# Patient Record
Sex: FEMALE | Race: BLACK OR AFRICAN AMERICAN | NOT HISPANIC OR LATINO | URBAN - METROPOLITAN AREA
[De-identification: names, ages, dates, MRNs, and addresses within clinical notes are randomized per-mention and may not be internally consistent; named-entity substitution may affect disease eponyms.]

---

## 2018-11-15 ENCOUNTER — OFFICE VISIT - HEALTHEAST (OUTPATIENT)
Dept: FAMILY MEDICINE | Facility: CLINIC | Age: 18
End: 2018-11-15

## 2018-11-15 ENCOUNTER — COMMUNICATION - HEALTHEAST (OUTPATIENT)
Dept: TELEHEALTH | Facility: CLINIC | Age: 18
End: 2018-11-15

## 2018-11-15 DIAGNOSIS — R76.8 ELEVATED ANTINUCLEAR ANTIBODY (ANA) LEVEL: ICD-10-CM

## 2018-11-15 DIAGNOSIS — L65.9 HAIR LOSS: ICD-10-CM

## 2018-11-15 LAB — TSH SERPL DL<=0.005 MIU/L-ACNC: 1.33 UIU/ML (ref 0.3–5)

## 2018-11-15 ASSESSMENT — MIFFLIN-ST. JEOR: SCORE: 1212.21

## 2018-11-16 ENCOUNTER — COMMUNICATION - HEALTHEAST (OUTPATIENT)
Dept: FAMILY MEDICINE | Facility: CLINIC | Age: 18
End: 2018-11-16

## 2019-03-06 ENCOUNTER — OFFICE VISIT - HEALTHEAST (OUTPATIENT)
Dept: FAMILY MEDICINE | Facility: CLINIC | Age: 19
End: 2019-03-06

## 2019-03-06 DIAGNOSIS — B37.31 YEAST INFECTION OF THE VAGINA: ICD-10-CM

## 2019-03-06 ASSESSMENT — MIFFLIN-ST. JEOR: SCORE: 1198.03

## 2019-04-08 ENCOUNTER — AMBULATORY - HEALTHEAST (OUTPATIENT)
Dept: FAMILY MEDICINE | Facility: CLINIC | Age: 19
End: 2019-04-08

## 2019-04-08 ENCOUNTER — OFFICE VISIT - HEALTHEAST (OUTPATIENT)
Dept: FAMILY MEDICINE | Facility: CLINIC | Age: 19
End: 2019-04-08

## 2019-04-08 DIAGNOSIS — N76.0 ACUTE VAGINITIS: ICD-10-CM

## 2019-04-08 DIAGNOSIS — B37.31 YEAST INFECTION OF THE VAGINA: ICD-10-CM

## 2019-04-08 LAB
CLUE CELLS: ABNORMAL
TRICHOMONAS, WET PREP: ABNORMAL
YEAST, WET PREP: ABNORMAL

## 2019-04-08 ASSESSMENT — MIFFLIN-ST. JEOR: SCORE: 1188.96

## 2019-04-11 ENCOUNTER — COMMUNICATION - HEALTHEAST (OUTPATIENT)
Dept: FAMILY MEDICINE | Facility: CLINIC | Age: 19
End: 2019-04-11

## 2019-04-23 ENCOUNTER — OFFICE VISIT - HEALTHEAST (OUTPATIENT)
Dept: FAMILY MEDICINE | Facility: CLINIC | Age: 19
End: 2019-04-23

## 2019-04-23 DIAGNOSIS — R07.81 RIB PAIN ON RIGHT SIDE: ICD-10-CM

## 2019-04-23 DIAGNOSIS — S22.31XA CLOSED FRACTURE OF ONE RIB OF RIGHT SIDE, INITIAL ENCOUNTER: ICD-10-CM

## 2019-05-08 ENCOUNTER — COMMUNICATION - HEALTHEAST (OUTPATIENT)
Dept: SCHEDULING | Facility: CLINIC | Age: 19
End: 2019-05-08

## 2019-05-08 ENCOUNTER — OFFICE VISIT - HEALTHEAST (OUTPATIENT)
Dept: FAMILY MEDICINE | Facility: CLINIC | Age: 19
End: 2019-05-08

## 2019-05-08 DIAGNOSIS — J01.90 ACUTE NON-RECURRENT SINUSITIS, UNSPECIFIED LOCATION: ICD-10-CM

## 2019-05-22 ENCOUNTER — OFFICE VISIT (OUTPATIENT)
Dept: URGENT CARE | Facility: URGENT CARE | Age: 19
End: 2019-05-22
Payer: COMMERCIAL

## 2019-05-22 VITALS
HEART RATE: 94 BPM | OXYGEN SATURATION: 100 % | DIASTOLIC BLOOD PRESSURE: 82 MMHG | WEIGHT: 101.4 LBS | TEMPERATURE: 98.7 F | SYSTOLIC BLOOD PRESSURE: 118 MMHG

## 2019-05-22 DIAGNOSIS — R42 DIZZINESS: Primary | ICD-10-CM

## 2019-05-22 LAB
BASOPHILS # BLD AUTO: 0 10E9/L (ref 0–0.2)
BASOPHILS NFR BLD AUTO: 0.2 %
DIFFERENTIAL METHOD BLD: NORMAL
EOSINOPHIL # BLD AUTO: 0.1 10E9/L (ref 0–0.7)
EOSINOPHIL NFR BLD AUTO: 0.9 %
ERYTHROCYTE [DISTWIDTH] IN BLOOD BY AUTOMATED COUNT: 11.8 % (ref 10–15)
HCT VFR BLD AUTO: 40 % (ref 35–47)
HGB BLD-MCNC: 13.1 G/DL (ref 11.7–15.7)
LYMPHOCYTES # BLD AUTO: 3 10E9/L (ref 0.8–5.3)
LYMPHOCYTES NFR BLD AUTO: 36.2 %
MCH RBC QN AUTO: 30.1 PG (ref 26.5–33)
MCHC RBC AUTO-ENTMCNC: 32.8 G/DL (ref 31.5–36.5)
MCV RBC AUTO: 92 FL (ref 78–100)
MONOCYTES # BLD AUTO: 0.6 10E9/L (ref 0–1.3)
MONOCYTES NFR BLD AUTO: 7.5 %
NEUTROPHILS # BLD AUTO: 4.5 10E9/L (ref 1.6–8.3)
NEUTROPHILS NFR BLD AUTO: 55.2 %
PLATELET # BLD AUTO: 291 10E9/L (ref 150–450)
RBC # BLD AUTO: 4.35 10E12/L (ref 3.8–5.2)
WBC # BLD AUTO: 8.2 10E9/L (ref 4–11)

## 2019-05-22 PROCEDURE — 36415 COLL VENOUS BLD VENIPUNCTURE: CPT | Performed by: PHYSICIAN ASSISTANT

## 2019-05-22 PROCEDURE — 85025 COMPLETE CBC W/AUTO DIFF WBC: CPT | Performed by: PHYSICIAN ASSISTANT

## 2019-05-22 PROCEDURE — 99203 OFFICE O/P NEW LOW 30 MIN: CPT | Performed by: PHYSICIAN ASSISTANT

## 2019-05-22 RX ORDER — MECLIZINE HYDROCHLORIDE 25 MG/1
25 TABLET ORAL 3 TIMES DAILY PRN
Qty: 30 TABLET | Refills: 0 | Status: SHIPPED | OUTPATIENT
Start: 2019-05-22

## 2019-05-23 NOTE — PATIENT INSTRUCTIONS
Patient Education     Labyrinthitis    The inner ear is located behind the middle ear. It is part of the balance center of your body. When the inner ear becomes irritated or inflamed it causes a condition known as labyrinthitis. It may due to a viral infection, but often a cause is not found. Labyrinthitis causes sudden dizziness and balance problems. It often causes a feeling that you or the room is spinning (vertigo). You may feel nauseated or throw up. You may also feel a loss of balance when trying to walk. Head movement from side to side or changes in body position (from lying to sitting or standing) may worsen symptoms. You may have ringing in the ear. Hearing may also be affected.  An episode of labyrinthitis may last seconds, minutes, or hours. It may never return. Or symptoms may recur off and on over several weeks or longer. In many cases, the problem is short-term and goes away when the inner ear issue resolves.  Home care    Take medicine as prescribed to relieve your symptoms. Unless another medicine was prescribed, you can try over-the-counter motion sickness pills. Note that these medicines may cause drowsiness.    If symptoms are severe, rest quietly in bed. Change positions slowly. There may be 1 position feels best, such as lying on 1 side or lying on your back with your head slightly raised on pillows. Until you have no symptoms, you are at a higher risk of falling. Let someone help you when you get up. Get rid of home hazards such as loose electrical cords and throw rugs. Don t walk in unfamiliar areas that are not lighted. Use night lights in bathrooms and kitchen areas.    Vestibular rehabilitation exercises are done by moving your head to help fix problems in the inner ear. If these exercises have been prescribed, do them as you have been instructed.    Do not drive or work with dangerous machinery until 1 week has passed without symptoms. Be careful when using stairs.  Follow-up  care  Follow up with your healthcare provider or as advised by our staff.  When to seek medical advice  Call your healthcare provider for any of the following:    Symptoms that are not controlled by medicine     Symptoms that get worse    Repeated vomiting that is not relieved by medicine    Weakness that gets worse    Fainting    Headache or unusual drowsiness    Trouble with vision or speech    Trouble moving your face, arms, or legs    Hearing loss    Symptoms that last more than a few days  Date Last Reviewed: 11/1/2017 2000-2018 [employer] [address] [place] [address]. All rights reserved. This information is not intended as a substitute for professional medical care. Always follow your healthcare professional's instructions.

## 2019-06-01 NOTE — PROGRESS NOTES
SUBJECTIVE:   Rosette Montero is a 18 year old female who complains of new onset positional vertigo for 21 days. Has not had this in the past. The patient denies any other symptoms of neurological impairment or TIA's; no amaurosis, diplopia, dysphasia, or unilateral disturbance of motor or sensory function. No headaches. No hearing loss or tinnitus, nor head injury. No palpitations or syncope. Healthy in the past.    OBJECTIVE:  Appears well, in no apparent distress. Vitals normal. Ears normal. Neck supple. No adenopathy or masses in the neck or supraclavicular regions. Cranial nerves are normal. Fundi are normal with sharp disc margins, no papilledema, hemorrhages or exudates noted. MARLI. EOM's intact. DTR's normal and symmetric. Mental status normal. Gait and station normal. Romberg negative. Cerebellar function is normal. No internuclear ophthalmoplegia. Pulse regular. Rapid changes in position during the exam do precipitate brief dizziness without nystagmus.    Results for orders placed or performed in visit on 05/22/19   CBC with platelets and differential   Result Value Ref Range    WBC 8.2 4.0 - 11.0 10e9/L    RBC Count 4.35 3.8 - 5.2 10e12/L    Hemoglobin 13.1 11.7 - 15.7 g/dL    Hematocrit 40.0 35.0 - 47.0 %    MCV 92 78 - 100 fl    MCH 30.1 26.5 - 33.0 pg    MCHC 32.8 31.5 - 36.5 g/dL    RDW 11.8 10.0 - 15.0 %    Platelet Count 291 150 - 450 10e9/L    Diff Method Automated Method     % Neutrophils 55.2 %    % Lymphocytes 36.2 %    % Monocytes 7.5 %    % Eosinophils 0.9 %    % Basophils 0.2 %    Absolute Neutrophil 4.5 1.6 - 8.3 10e9/L    Absolute Lymphocytes 3.0 0.8 - 5.3 10e9/L    Absolute Monocytes 0.6 0.0 - 1.3 10e9/L    Absolute Eosinophils 0.1 0.0 - 0.7 10e9/L    Absolute Basophils 0.0 0.0 - 0.2 10e9/L         ASSESSMENT:  (R42) Dizziness  (primary encounter diagnosis)  Comment: no evidence of central etiology  Plan: CBC with platelets and differential, meclizine         (ANTIVERT) 25 MG  tablet    PLAN:  The patient is reassured that these symptoms do not appear to represent a serious or threatening condition. This is generally a self-limited temporary but uncomfortable situation. Rest, avoid potentially dangerous activities (such as driving or working with machinery or at heights), use OTC Meclizine prn. Asked to call if develops other symptoms, such as alterations of speech, swallowing, vision, motor or sensory systems, or if dizziness persists or worsens.

## 2019-06-20 ENCOUNTER — OFFICE VISIT - HEALTHEAST (OUTPATIENT)
Dept: FAMILY MEDICINE | Facility: CLINIC | Age: 19
End: 2019-06-20

## 2019-06-20 DIAGNOSIS — R22.0 FACIAL SWELLING: ICD-10-CM

## 2019-08-27 ENCOUNTER — OFFICE VISIT - HEALTHEAST (OUTPATIENT)
Dept: FAMILY MEDICINE | Facility: CLINIC | Age: 19
End: 2019-08-27

## 2019-08-27 DIAGNOSIS — Z00.00 ANNUAL PHYSICAL EXAM: ICD-10-CM

## 2019-08-27 DIAGNOSIS — F41.9 ANXIETY: ICD-10-CM

## 2019-08-27 DIAGNOSIS — L70.0 ACNE VULGARIS: ICD-10-CM

## 2019-08-27 DIAGNOSIS — F33.1 MODERATE EPISODE OF RECURRENT MAJOR DEPRESSIVE DISORDER (H): ICD-10-CM

## 2019-08-27 DIAGNOSIS — Z23 NEED FOR VACCINATION: ICD-10-CM

## 2019-08-27 LAB — HGB BLD-MCNC: 14.6 G/DL (ref 12–16)

## 2019-08-27 ASSESSMENT — MIFFLIN-ST. JEOR: SCORE: 1169.12

## 2019-08-28 LAB
ANION GAP SERPL CALCULATED.3IONS-SCNC: 14 MMOL/L (ref 5–18)
BKR LAB AP ABNORMAL BLEEDING: NO
BKR LAB AP BIRTH CONTROL/HORMONES: NORMAL
BKR LAB AP CERVICAL APPEARANCE: NORMAL
BKR LAB AP GYN ADEQUACY: NORMAL
BKR LAB AP GYN INTERPRETATION: NORMAL
BKR LAB AP HPV REFLEX: NORMAL
BKR LAB AP LMP: NORMAL
BKR LAB AP PATIENT STATUS: NORMAL
BKR LAB AP PREVIOUS ABNORMAL: NO
BKR LAB AP PREVIOUS NORMAL: NORMAL
BUN SERPL-MCNC: 17 MG/DL (ref 8–22)
CALCIUM SERPL-MCNC: 10.3 MG/DL (ref 8.5–10.5)
CHLORIDE BLD-SCNC: 103 MMOL/L (ref 98–107)
CHOLEST SERPL-MCNC: 186 MG/DL
CO2 SERPL-SCNC: 24 MMOL/L (ref 22–31)
CREAT SERPL-MCNC: 1 MG/DL (ref 0.6–1.1)
FASTING STATUS PATIENT QL REPORTED: NO
GFR SERPL CREATININE-BSD FRML MDRD: >60 ML/MIN/1.73M2
GLUCOSE BLD-MCNC: 92 MG/DL (ref 70–125)
HDLC SERPL-MCNC: 74 MG/DL
HIGH RISK?: NO
HIV 1+2 AB+HIV1 P24 AG SERPL QL IA: NEGATIVE
LDLC SERPL CALC-MCNC: 102 MG/DL
PATH REPORT.COMMENTS IMP SPEC: NORMAL
POTASSIUM BLD-SCNC: 3.9 MMOL/L (ref 3.5–5)
RESULT FLAG (HE HISTORICAL CONVERSION): NORMAL
SODIUM SERPL-SCNC: 141 MMOL/L (ref 136–145)
TRIGL SERPL-MCNC: 51 MG/DL

## 2019-08-29 LAB
25(OH)D3 SERPL-MCNC: 35.9 NG/ML (ref 30–80)
25(OH)D3 SERPL-MCNC: 35.9 NG/ML (ref 30–80)

## 2019-09-21 ENCOUNTER — COMMUNICATION - HEALTHEAST (OUTPATIENT)
Dept: FAMILY MEDICINE | Facility: CLINIC | Age: 19
End: 2019-09-21

## 2019-09-21 DIAGNOSIS — F41.9 ANXIETY: ICD-10-CM

## 2019-09-21 DIAGNOSIS — F33.1 MODERATE EPISODE OF RECURRENT MAJOR DEPRESSIVE DISORDER (H): ICD-10-CM

## 2019-09-24 ENCOUNTER — OFFICE VISIT - HEALTHEAST (OUTPATIENT)
Dept: FAMILY MEDICINE | Facility: CLINIC | Age: 19
End: 2019-09-24

## 2019-09-24 DIAGNOSIS — F33.1 MODERATE EPISODE OF RECURRENT MAJOR DEPRESSIVE DISORDER (H): ICD-10-CM

## 2019-09-24 DIAGNOSIS — L70.0 ACNE VULGARIS: ICD-10-CM

## 2019-09-24 DIAGNOSIS — F41.9 ANXIETY: ICD-10-CM

## 2019-09-24 ASSESSMENT — ANXIETY QUESTIONNAIRES
6. BECOMING EASILY ANNOYED OR IRRITABLE: NOT AT ALL
1. FEELING NERVOUS, ANXIOUS, OR ON EDGE: MORE THAN HALF THE DAYS
GAD7 TOTAL SCORE: 6
5. BEING SO RESTLESS THAT IT IS HARD TO SIT STILL: SEVERAL DAYS
3. WORRYING TOO MUCH ABOUT DIFFERENT THINGS: SEVERAL DAYS
7. FEELING AFRAID AS IF SOMETHING AWFUL MIGHT HAPPEN: SEVERAL DAYS
4. TROUBLE RELAXING: NOT AT ALL
2. NOT BEING ABLE TO STOP OR CONTROL WORRYING: SEVERAL DAYS

## 2019-09-24 ASSESSMENT — PATIENT HEALTH QUESTIONNAIRE - PHQ9: SUM OF ALL RESPONSES TO PHQ QUESTIONS 1-9: 15

## 2019-09-24 ASSESSMENT — MIFFLIN-ST. JEOR: SCORE: 1171.38

## 2019-10-23 ENCOUNTER — COMMUNICATION - HEALTHEAST (OUTPATIENT)
Dept: FAMILY MEDICINE | Facility: CLINIC | Age: 19
End: 2019-10-23

## 2019-10-23 DIAGNOSIS — L70.0 ACNE VULGARIS: ICD-10-CM

## 2019-11-13 ENCOUNTER — COMMUNICATION - HEALTHEAST (OUTPATIENT)
Dept: FAMILY MEDICINE | Facility: CLINIC | Age: 19
End: 2019-11-13

## 2019-11-14 ENCOUNTER — AMBULATORY - HEALTHEAST (OUTPATIENT)
Dept: FAMILY MEDICINE | Facility: CLINIC | Age: 19
End: 2019-11-14

## 2019-11-14 DIAGNOSIS — B37.31 VAGINAL YEAST INFECTION: ICD-10-CM

## 2019-11-18 ENCOUNTER — COMMUNICATION - HEALTHEAST (OUTPATIENT)
Dept: FAMILY MEDICINE | Facility: CLINIC | Age: 19
End: 2019-11-18

## 2019-11-23 ENCOUNTER — COMMUNICATION - HEALTHEAST (OUTPATIENT)
Dept: FAMILY MEDICINE | Facility: CLINIC | Age: 19
End: 2019-11-23

## 2020-01-21 ENCOUNTER — COMMUNICATION - HEALTHEAST (OUTPATIENT)
Dept: FAMILY MEDICINE | Facility: CLINIC | Age: 20
End: 2020-01-21

## 2020-01-21 DIAGNOSIS — L70.0 ACNE VULGARIS: ICD-10-CM

## 2020-01-27 ENCOUNTER — COMMUNICATION - HEALTHEAST (OUTPATIENT)
Dept: FAMILY MEDICINE | Facility: CLINIC | Age: 20
End: 2020-01-27

## 2020-01-30 ENCOUNTER — OFFICE VISIT - HEALTHEAST (OUTPATIENT)
Dept: FAMILY MEDICINE | Facility: CLINIC | Age: 20
End: 2020-01-30

## 2020-01-30 DIAGNOSIS — F33.2 SEVERE EPISODE OF RECURRENT MAJOR DEPRESSIVE DISORDER, WITHOUT PSYCHOTIC FEATURES (H): ICD-10-CM

## 2020-01-30 DIAGNOSIS — F41.9 ANXIETY: ICD-10-CM

## 2020-01-30 DIAGNOSIS — Z30.09 BIRTH CONTROL COUNSELING: ICD-10-CM

## 2020-01-30 ASSESSMENT — ANXIETY QUESTIONNAIRES
2. NOT BEING ABLE TO STOP OR CONTROL WORRYING: NOT AT ALL
3. WORRYING TOO MUCH ABOUT DIFFERENT THINGS: SEVERAL DAYS
5. BEING SO RESTLESS THAT IT IS HARD TO SIT STILL: MORE THAN HALF THE DAYS
IF YOU CHECKED OFF ANY PROBLEMS ON THIS QUESTIONNAIRE, HOW DIFFICULT HAVE THESE PROBLEMS MADE IT FOR YOU TO DO YOUR WORK, TAKE CARE OF THINGS AT HOME, OR GET ALONG WITH OTHER PEOPLE: NOT DIFFICULT AT ALL
GAD7 TOTAL SCORE: 7
6. BECOMING EASILY ANNOYED OR IRRITABLE: SEVERAL DAYS
7. FEELING AFRAID AS IF SOMETHING AWFUL MIGHT HAPPEN: SEVERAL DAYS
4. TROUBLE RELAXING: SEVERAL DAYS
1. FEELING NERVOUS, ANXIOUS, OR ON EDGE: SEVERAL DAYS

## 2020-01-30 ASSESSMENT — PATIENT HEALTH QUESTIONNAIRE - PHQ9: SUM OF ALL RESPONSES TO PHQ QUESTIONS 1-9: 13

## 2020-03-22 ENCOUNTER — COMMUNICATION - HEALTHEAST (OUTPATIENT)
Dept: FAMILY MEDICINE | Facility: CLINIC | Age: 20
End: 2020-03-22

## 2020-03-22 DIAGNOSIS — F41.9 ANXIETY: ICD-10-CM

## 2020-03-22 DIAGNOSIS — F33.2 SEVERE EPISODE OF RECURRENT MAJOR DEPRESSIVE DISORDER, WITHOUT PSYCHOTIC FEATURES (H): ICD-10-CM

## 2020-04-23 ENCOUNTER — COMMUNICATION - HEALTHEAST (OUTPATIENT)
Dept: FAMILY MEDICINE | Facility: CLINIC | Age: 20
End: 2020-04-23

## 2020-04-23 DIAGNOSIS — F41.9 ANXIETY: ICD-10-CM

## 2020-04-23 DIAGNOSIS — F33.2 SEVERE EPISODE OF RECURRENT MAJOR DEPRESSIVE DISORDER, WITHOUT PSYCHOTIC FEATURES (H): ICD-10-CM

## 2020-04-27 ENCOUNTER — OFFICE VISIT - HEALTHEAST (OUTPATIENT)
Dept: FAMILY MEDICINE | Facility: CLINIC | Age: 20
End: 2020-04-27

## 2020-04-27 DIAGNOSIS — L70.0 ACNE VULGARIS: ICD-10-CM

## 2020-04-27 DIAGNOSIS — Z30.09 BIRTH CONTROL COUNSELING: ICD-10-CM

## 2020-04-27 DIAGNOSIS — F41.9 ANXIETY: ICD-10-CM

## 2020-04-27 DIAGNOSIS — F33.1 MODERATE EPISODE OF RECURRENT MAJOR DEPRESSIVE DISORDER (H): ICD-10-CM

## 2020-04-27 ASSESSMENT — ANXIETY QUESTIONNAIRES
3. WORRYING TOO MUCH ABOUT DIFFERENT THINGS: SEVERAL DAYS
1. FEELING NERVOUS, ANXIOUS, OR ON EDGE: MORE THAN HALF THE DAYS
7. FEELING AFRAID AS IF SOMETHING AWFUL MIGHT HAPPEN: NOT AT ALL
4. TROUBLE RELAXING: SEVERAL DAYS
GAD7 TOTAL SCORE: 9
5. BEING SO RESTLESS THAT IT IS HARD TO SIT STILL: NEARLY EVERY DAY
2. NOT BEING ABLE TO STOP OR CONTROL WORRYING: NOT AT ALL
IF YOU CHECKED OFF ANY PROBLEMS ON THIS QUESTIONNAIRE, HOW DIFFICULT HAVE THESE PROBLEMS MADE IT FOR YOU TO DO YOUR WORK, TAKE CARE OF THINGS AT HOME, OR GET ALONG WITH OTHER PEOPLE: NOT DIFFICULT AT ALL
6. BECOMING EASILY ANNOYED OR IRRITABLE: MORE THAN HALF THE DAYS

## 2020-04-27 ASSESSMENT — PATIENT HEALTH QUESTIONNAIRE - PHQ9: SUM OF ALL RESPONSES TO PHQ QUESTIONS 1-9: 13

## 2020-05-14 ENCOUNTER — AMBULATORY - HEALTHEAST (OUTPATIENT)
Dept: LAB | Facility: CLINIC | Age: 20
End: 2020-05-14

## 2020-05-14 ENCOUNTER — OFFICE VISIT - HEALTHEAST (OUTPATIENT)
Dept: FAMILY MEDICINE | Facility: CLINIC | Age: 20
End: 2020-05-14

## 2020-05-14 DIAGNOSIS — N89.8 VAGINAL DISCHARGE: ICD-10-CM

## 2020-05-14 DIAGNOSIS — Z20.2 POSSIBLE EXPOSURE TO STD: ICD-10-CM

## 2020-05-15 LAB
C TRACH DNA SPEC QL PROBE+SIG AMP: NEGATIVE
N GONORRHOEA DNA SPEC QL NAA+PROBE: NEGATIVE

## 2020-05-24 ENCOUNTER — COMMUNICATION - HEALTHEAST (OUTPATIENT)
Dept: FAMILY MEDICINE | Facility: CLINIC | Age: 20
End: 2020-05-24

## 2020-05-31 ENCOUNTER — COMMUNICATION - HEALTHEAST (OUTPATIENT)
Dept: FAMILY MEDICINE | Facility: CLINIC | Age: 20
End: 2020-05-31

## 2020-09-11 ENCOUNTER — OFFICE VISIT - HEALTHEAST (OUTPATIENT)
Dept: FAMILY MEDICINE | Facility: CLINIC | Age: 20
End: 2020-09-11

## 2020-09-11 DIAGNOSIS — B96.89 BACTERIAL VAGINITIS: ICD-10-CM

## 2020-09-11 DIAGNOSIS — B37.31 YEAST INFECTION OF THE VAGINA: ICD-10-CM

## 2020-09-11 DIAGNOSIS — N76.0 BACTERIAL VAGINITIS: ICD-10-CM

## 2020-09-11 DIAGNOSIS — N89.8 VAGINAL DISCHARGE: ICD-10-CM

## 2020-09-11 LAB
CLUE CELLS: ABNORMAL
TRICHOMONAS, WET PREP: ABNORMAL
YEAST, WET PREP: ABNORMAL

## 2020-09-11 ASSESSMENT — MIFFLIN-ST. JEOR: SCORE: 1164.3

## 2020-09-13 ENCOUNTER — COMMUNICATION - HEALTHEAST (OUTPATIENT)
Dept: FAMILY MEDICINE | Facility: CLINIC | Age: 20
End: 2020-09-13

## 2020-09-14 ENCOUNTER — AMBULATORY - HEALTHEAST (OUTPATIENT)
Dept: FAMILY MEDICINE | Facility: CLINIC | Age: 20
End: 2020-09-14

## 2020-09-14 ENCOUNTER — COMMUNICATION - HEALTHEAST (OUTPATIENT)
Dept: FAMILY MEDICINE | Facility: CLINIC | Age: 20
End: 2020-09-14

## 2020-09-14 DIAGNOSIS — B96.89 BACTERIAL VAGINITIS: ICD-10-CM

## 2020-09-14 DIAGNOSIS — N76.0 BACTERIAL VAGINITIS: ICD-10-CM

## 2020-09-14 DIAGNOSIS — F41.9 ANXIETY: ICD-10-CM

## 2020-09-14 DIAGNOSIS — F33.1 MODERATE EPISODE OF RECURRENT MAJOR DEPRESSIVE DISORDER (H): ICD-10-CM

## 2020-09-26 ENCOUNTER — COMMUNICATION - HEALTHEAST (OUTPATIENT)
Dept: FAMILY MEDICINE | Facility: CLINIC | Age: 20
End: 2020-09-26

## 2020-09-26 DIAGNOSIS — N89.8 VAGINAL DISCHARGE: ICD-10-CM

## 2020-09-26 DIAGNOSIS — Z11.3 SCREEN FOR STD (SEXUALLY TRANSMITTED DISEASE): ICD-10-CM

## 2020-09-26 DIAGNOSIS — B37.31 YEAST INFECTION OF THE VAGINA: ICD-10-CM

## 2020-09-29 ENCOUNTER — AMBULATORY - HEALTHEAST (OUTPATIENT)
Dept: LAB | Facility: CLINIC | Age: 20
End: 2020-09-29

## 2020-09-29 DIAGNOSIS — Z11.3 SCREEN FOR STD (SEXUALLY TRANSMITTED DISEASE): ICD-10-CM

## 2020-09-30 LAB
C TRACH DNA SPEC QL PROBE+SIG AMP: NEGATIVE
N GONORRHOEA DNA SPEC QL NAA+PROBE: NEGATIVE

## 2020-10-06 ENCOUNTER — COMMUNICATION - HEALTHEAST (OUTPATIENT)
Dept: FAMILY MEDICINE | Facility: CLINIC | Age: 20
End: 2020-10-06

## 2020-10-06 DIAGNOSIS — L70.0 ACNE VULGARIS: ICD-10-CM

## 2020-10-06 DIAGNOSIS — Z30.09 BIRTH CONTROL COUNSELING: ICD-10-CM

## 2020-11-16 ENCOUNTER — COMMUNICATION - HEALTHEAST (OUTPATIENT)
Dept: FAMILY MEDICINE | Facility: CLINIC | Age: 20
End: 2020-11-16

## 2020-12-24 ENCOUNTER — COMMUNICATION - HEALTHEAST (OUTPATIENT)
Dept: FAMILY MEDICINE | Facility: CLINIC | Age: 20
End: 2020-12-24

## 2020-12-24 DIAGNOSIS — B37.31 YEAST INFECTION OF THE VAGINA: ICD-10-CM

## 2021-01-21 ENCOUNTER — COMMUNICATION - HEALTHEAST (OUTPATIENT)
Dept: FAMILY MEDICINE | Facility: CLINIC | Age: 21
End: 2021-01-21

## 2021-01-25 ENCOUNTER — OFFICE VISIT - HEALTHEAST (OUTPATIENT)
Dept: FAMILY MEDICINE | Facility: CLINIC | Age: 21
End: 2021-01-25

## 2021-01-25 DIAGNOSIS — F32.1 MODERATE MAJOR DEPRESSION (H): ICD-10-CM

## 2021-01-25 DIAGNOSIS — F41.9 ANXIETY: ICD-10-CM

## 2021-01-25 DIAGNOSIS — F41.0 PANIC ATTACK: ICD-10-CM

## 2021-01-25 ASSESSMENT — ANXIETY QUESTIONNAIRES
GAD7 TOTAL SCORE: 6
6. BECOMING EASILY ANNOYED OR IRRITABLE: MORE THAN HALF THE DAYS
2. NOT BEING ABLE TO STOP OR CONTROL WORRYING: NOT AT ALL
7. FEELING AFRAID AS IF SOMETHING AWFUL MIGHT HAPPEN: NOT AT ALL
4. TROUBLE RELAXING: SEVERAL DAYS
5. BEING SO RESTLESS THAT IT IS HARD TO SIT STILL: SEVERAL DAYS
3. WORRYING TOO MUCH ABOUT DIFFERENT THINGS: NOT AT ALL
1. FEELING NERVOUS, ANXIOUS, OR ON EDGE: MORE THAN HALF THE DAYS

## 2021-01-25 ASSESSMENT — PATIENT HEALTH QUESTIONNAIRE - PHQ9: SUM OF ALL RESPONSES TO PHQ QUESTIONS 1-9: 7

## 2021-02-02 ENCOUNTER — COMMUNICATION - HEALTHEAST (OUTPATIENT)
Dept: FAMILY MEDICINE | Facility: CLINIC | Age: 21
End: 2021-02-02

## 2021-02-03 ENCOUNTER — AMBULATORY - HEALTHEAST (OUTPATIENT)
Dept: FAMILY MEDICINE | Facility: CLINIC | Age: 21
End: 2021-02-03

## 2021-02-03 DIAGNOSIS — F41.0 PANIC ATTACK: ICD-10-CM

## 2021-02-06 ENCOUNTER — AMBULATORY - HEALTHEAST (OUTPATIENT)
Dept: FAMILY MEDICINE | Facility: CLINIC | Age: 21
End: 2021-02-06

## 2021-02-06 DIAGNOSIS — F33.1 MODERATE EPISODE OF RECURRENT MAJOR DEPRESSIVE DISORDER (H): ICD-10-CM

## 2021-02-06 DIAGNOSIS — F41.9 ANXIETY: ICD-10-CM

## 2021-02-09 ENCOUNTER — AMBULATORY - HEALTHEAST (OUTPATIENT)
Dept: FAMILY MEDICINE | Facility: CLINIC | Age: 21
End: 2021-02-09

## 2021-02-09 DIAGNOSIS — F41.9 ANXIETY: ICD-10-CM

## 2021-02-09 DIAGNOSIS — F33.1 MODERATE EPISODE OF RECURRENT MAJOR DEPRESSIVE DISORDER (H): ICD-10-CM

## 2021-03-02 ENCOUNTER — COMMUNICATION - HEALTHEAST (OUTPATIENT)
Dept: FAMILY MEDICINE | Facility: CLINIC | Age: 21
End: 2021-03-02

## 2021-03-02 DIAGNOSIS — F41.0 PANIC ATTACK: ICD-10-CM

## 2021-05-26 ASSESSMENT — PATIENT HEALTH QUESTIONNAIRE - PHQ9: SUM OF ALL RESPONSES TO PHQ QUESTIONS 1-9: 15

## 2021-05-27 ASSESSMENT — PATIENT HEALTH QUESTIONNAIRE - PHQ9
SUM OF ALL RESPONSES TO PHQ QUESTIONS 1-9: 13
SUM OF ALL RESPONSES TO PHQ QUESTIONS 1-9: 13
SUM OF ALL RESPONSES TO PHQ QUESTIONS 1-9: 7

## 2021-05-27 NOTE — PROGRESS NOTES
1. Acute vaginitis  Wet Prep, Vaginal    CANCELED: Chlamydia trachomatis & Neisseria gonorrhoeae, Amplified Detection         ASSESSMENT/PLAN:     Body Mass Index was not assessed due to normal.    1. Acute vaginitis    - Wet Prep, Vaginal      There are no Patient Instructions on file for this visit.  There are no discontinued medications.  Return if symptoms worsen or fail to improve, for vaginitis.        Naima Martinez NP          SUBJECTIVE:  Rosette Montero is a 18 y.o. female who presents for evaluation of her vaginal discharge.  Onset: 3 days ago.  She is due to have her menstrual cycle in the next few days.  She describes her discharge as a thick, whitish green discoloration, she has noticed some redness with mild swelling on the external genitalia, no odor detected.  Symptoms have been constant, she describes the discomfort as more of a irritation.  She does typically shower, she does have a history of recurrent yeast infections, she did apply a new topical oil to her face that did have CBD oil in it and it was green in color.  She forgot to wash her hands fully after she applied it and when she went to bed, she did adjust her underwear and symptoms started shortly after that.  She is sexually active, she did engage in sexual intercourse the same day her symptoms started, her and her male partner did not use condoms typically.  She has been in a monogamous relationship for the last 18 months and she is not concerned about STD exposure.  She does typically wipe front to back after urination.  She denies any changes with urinary frequency, urgency or dysuria today.  She has not tried any over-the-counter remedies for her renal discharge symptoms.  She is rating her discomfort today a 3 out of 10 in the vaginal region. VSS.  Chief Complaint   Patient presents with     Vaginitis     poss yeast infection - discharge/green, irritation         There is no problem list on file for this patient.      Current  Outpatient Medications   Medication Sig Dispense Refill     FA/mv,Ca,iron,min/lycopene/lut (MULTIVITAL ORAL) Take by mouth.       Lactobacillus acidophilus (PROBIOTIC ORAL) Take by mouth.       No current facility-administered medications for this visit.        Social History     Tobacco Use   Smoking Status Never Smoker   Smokeless Tobacco Never Used       REVIEW OF SYSTEMS: Denies dysuria, frequency, urgency, fevers, chills, back pain, nausea, vomiting or abdominal pain.      TOBACCO USE:  Social History     Tobacco Use   Smoking Status Never Smoker   Smokeless Tobacco Never Used     Social History     Socioeconomic History     Marital status: Single     Spouse name: Not on file     Number of children: Not on file     Years of education: Not on file     Highest education level: Not on file   Occupational History     Not on file   Social Needs     Financial resource strain: Not on file     Food insecurity:     Worry: Not on file     Inability: Not on file     Transportation needs:     Medical: Not on file     Non-medical: Not on file   Tobacco Use     Smoking status: Never Smoker     Smokeless tobacco: Never Used   Substance and Sexual Activity     Alcohol use: Not on file     Drug use: Not on file     Sexual activity: Not on file   Lifestyle     Physical activity:     Days per week: Not on file     Minutes per session: Not on file     Stress: Not on file   Relationships     Social connections:     Talks on phone: Not on file     Gets together: Not on file     Attends Christian service: Not on file     Active member of club or organization: Not on file     Attends meetings of clubs or organizations: Not on file     Relationship status: Not on file     Intimate partner violence:     Fear of current or ex partner: Not on file     Emotionally abused: Not on file     Physically abused: Not on file     Forced sexual activity: Not on file   Other Topics Concern     Not on file   Social History Narrative     Not on file          OBJECTIVE:            Vitals:    04/08/19 1429   BP: 90/50   Pulse: 64   Resp: 12   Temp: 98.3  F (36.8  C)   SpO2: 98%     Weight: 104 lb (47.2 kg)    Wt Readings from Last 3 Encounters:   04/08/19 104 lb (47.2 kg)   03/06/19 106 lb (48.1 kg)   11/15/18 109 lb 2 oz (49.5 kg)     Body mass index is 18.87 kg/m .        Physical Exam:  GENERAL APPEARANCE: A&A, NAD, well hydrated, well nourished  NECK: Supple, without lymphadenopathy, no thyroid mass, no JVD, no bruit  CV: RRR, no M/G/R   LUNGS: CTAB, normal respiratory effort  ABDOMEN: S&NT, no masses, no organomegaly, BS present x4, no CVA tenderness  GENITAL: no edema, mild erythema on the external tibia, no lesion formation or drainage.  Internal exam reveals large amount of thick, white clumpy vaginal discharge, no lesion formation, bleeding or masses present.  No cervical motion tenderness, uterus is intact.  No odor detected during exam today.  SKIN:  Normal skin turgor, no lesions/rashes, warm and dry

## 2021-05-28 ASSESSMENT — ANXIETY QUESTIONNAIRES
GAD7 TOTAL SCORE: 7
GAD7 TOTAL SCORE: 6
GAD7 TOTAL SCORE: 6
GAD7 TOTAL SCORE: 9

## 2021-05-28 NOTE — PROGRESS NOTES
Chief Complaint   Patient presents with     Sinus Problem     Pt c/o sinus infection has moved into her ears x 2 weeks       HPI: 18-year-old female presents today with 2-week history of full years, cough, ear pain and mild, continuous sinus fullness and pressure.  She also notes occasionally feeling dizzy.    ROS: No fever vomiting or diarrhea or rashes    SH:    reports that she has never smoked. She has never used smokeless tobacco.      FH: The Patient's family history is not on file.     Meds:  Rosette has a current medication list which includes the following prescription(s): fa/mv,ca,iron,min/lycopene/lut, fluconazole, lactobacillus acidophilus, and amoxicillin-clavulanate.    O:  /64   Pulse 97   Wt 104 lb (47.2 kg)   LMP 04/16/2019 (Approximate)   SpO2 99%   BMI 18.87 kg/m     Constitutional:    --Vitals as above  --No acute distress  Eyes-  --Sclera noninjected  --Lids and conjunctiva normal  ENT-  --TMs clear  --Sclera noninjected  --Pharynx not erythematous  --Mild to moderate left max of facial pain to palpation  Neck-  --Neck supple    Lymph-  --moderate cervical lymphadenopathy  Lungs-  --Clear to Auscultation  Heart-  --Regular rate and rhythm  Skin-  --Pink and dry          A/P:   1. Acute non-recurrent sinusitis, unspecified location  - amoxicillin-clavulanate (AUGMENTIN) 875-125 mg per tablet; Take 1 tablet by mouth 2 (two) times a day for 14 days.  Dispense: 28 tablet; Refill: 0  -Increase sinus irrigation    2.  Facial pain  -Ibuprofen and over-the-counter medicines as needed

## 2021-05-28 NOTE — TELEPHONE ENCOUNTER
Pt called in states she has sinus pain.  The symptom started 2 weeks ago.  The pain is not bad.  Pt has dizziness at work today.  Pt symptom is moderate.  Has ear pain and pressure.  Nasal discharge green color.  Has nasal congestion.  Feels hot not sure if she has fever.  No sore throat, no cough, has difficulty breathing because of the congestion.  Pt states she is not pregnant.  The disposition is to be seen today.  Care advice given per protocol.  Patient agrees with care advice given.   Pt states she will go to the clinic today.  Pt make appointment to see  today.  Agreed to call back if he has additional symptoms or questions.      Cali Carrillo RN, [facility] Triage/Med Refill 5/8/2019 2:19 PM        Reason for Disposition    Sinus congestion (pressure, fullness) present > 10 days    Protocols used: SINUS PAIN AND CONGESTION-A-[place]
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
## 2021-06-01 NOTE — PROGRESS NOTES
1. Anxiety  escitalopram oxalate (LEXAPRO) 10 MG tablet   2. Moderate episode of recurrent major depressive disorder (H)  escitalopram oxalate (LEXAPRO) 10 MG tablet   3. Acne vulgaris       PHQ-9:  15  KWABENA-7: 6    ASSESSMENT/PLAN:     The following are part of a depression follow up plan for the patient:  coping support assessment and emotional support assessment    1. Anxiety    - escitalopram oxalate (LEXAPRO) 10 MG tablet; Take 1 tablet (10 mg total) by mouth daily.  Dispense: 90 tablet; Refill: 1    2. Moderate episode of recurrent major depressive disorder (H)    - escitalopram oxalate (LEXAPRO) 10 MG tablet; Take 1 tablet (10 mg total) by mouth daily.  Dispense: 90 tablet; Refill: 1      There are no Patient Instructions on file for this visit.  Medications Discontinued During This Encounter   Medication Reason     escitalopram oxalate (LEXAPRO) 10 MG tablet Reorder     Return in about 6 months (around 3/24/2020) for depression, anxiety.        Naima Martinez NP          SUBJECTIVE:  Rosette Montero is a 19 y.o. female who presents for anxiety and depression follow-up.  Patient was initiated on Lexapro 10 mg daily 4 weeks ago for her anxiety and depressive symptoms.  Since starting the medication she has noted a significant increase in her anxiety, she is able to work her whole 8-hour shift and still go out to an be involved in social events or exercise.  She is sleeping a bit more, she has noticed some fatigue from the medication, I did advise her to take the medication at night if she is not doing so already.  She has noticed a slight decrease in her appetite but states that her appetite was never great prior to starting the medication.  She has definitely noticed a change in her depression and does not feel down in the dumps like she did before daily, she still has some bad days but overall she has noticed that it is not as severe as it had been.  She continues to wake up in the middle the night  feeling panicked, this was happening prior to starting the medication however.  She states that when she sleeps alone without her boyfriend, she can guarantee that 90% of the time she will wake up in a panic state.  She does take 1 to 2 mg of melatonin prior to bed at times when she knows that she will be sleeping by herself.  She would like to continue with the same dose of medication and she will follow-up here in the clinic in the next 3 to 6 months, sooner if she has any other concerns.  She denies any thoughts of self-harm or plans for suicide today.  Currently not seeing a counselor and feels that she is managing well on the current medication that she has been prescribed.     She has noticed some slight improvement with her acne since starting oral contraception.  She did have some questions in regards to the risks associated with taking an estrogen product.  She had been googling about breast cancer risk increasing.  We discussed the risks of oral contraception, I did encourage her to do her monthly breast self exams to become familiar with how her breast feel, she is a non-smoker and I encouraged her not to  this habit.  She was advised to come to the clinic if she noticed any significant changes with her breasts but that would need further evaluation.  At this time, she experiences tenderness around the time of her period but nothing else that has been significant.  Chief Complaint   Patient presents with     Follow-up     Med Check         Patient Active Problem List   Diagnosis     Acne vulgaris     Severe episode of recurrent major depressive disorder, without psychotic features (H)     Anxiety       Current Outpatient Medications   Medication Sig Dispense Refill     cetirizine (ZYRTEC) 10 MG tablet Take 10 mg by mouth daily.       diphenhydrAMINE (BENADRYL) 25 mg capsule Take 25 mg by mouth every 6 (six) hours as needed for itching.       escitalopram oxalate (LEXAPRO) 10 MG tablet Take 1 tablet  (10 mg total) by mouth daily. 90 tablet 1     FA/mv,Ca,iron,min/lycopene/lut (MULTIVITAL ORAL) Take by mouth.       ketoconazole (NIZORAL) 2 % shampoo Apply topically.       Lactobacillus acidophilus (PROBIOTIC ORAL) Take by mouth.       meclizine (ANTIVERT) 25 mg tablet Take 25 mg by mouth.       norgestimate-ethinyl estradiol (ORTHO-CYCLEN) 0.25-35 mg-mcg per tablet Take 1 tablet by mouth daily. 3 Package 0     tretinoin (RETIN-A) 0.05 % cream Apply topically.       No current facility-administered medications for this visit.        Social History     Tobacco Use   Smoking Status Never Smoker   Smokeless Tobacco Never Used       REVIEW OF SYSTEMS: Denies excessive worries, restlessness, on edge, irritability, decreased concentration, muscle tension, fear of health.      TOBACCO USE:  Social History     Tobacco Use   Smoking Status Never Smoker   Smokeless Tobacco Never Used     Social History     Socioeconomic History     Marital status: Single     Spouse name: Not on file     Number of children: Not on file     Years of education: Not on file     Highest education level: Not on file   Occupational History     Not on file   Social Needs     Financial resource strain: Not on file     Food insecurity:     Worry: Not on file     Inability: Not on file     Transportation needs:     Medical: Not on file     Non-medical: Not on file   Tobacco Use     Smoking status: Never Smoker     Smokeless tobacco: Never Used   Substance and Sexual Activity     Alcohol use: Not on file     Drug use: Not on file     Sexual activity: Not on file   Lifestyle     Physical activity:     Days per week: Not on file     Minutes per session: Not on file     Stress: Not on file   Relationships     Social connections:     Talks on phone: Not on file     Gets together: Not on file     Attends Uats[employer] service: Not on file     Active member of club or organization: Not on file     Attends meetings of clubs or organizations: Not on file      Relationship status: Not on file     Intimate partner violence:     Fear of current or ex partner: Not on file     Emotionally abused: Not on file     Physically abused: Not on file     Forced sexual activity: Not on file   Other Topics Concern     Not on file   Social History Narrative     Not on file         OBJECTIVE:            Vitals:    09/24/19 1505   BP: 100/62   Pulse: 63   Resp: 14   Temp: 98.2  F (36.8  C)   SpO2: 99%     Weight: 101 lb (45.8 kg)    Wt Readings from Last 3 Encounters:   09/24/19 101 lb (45.8 kg)   08/27/19 100 lb 8 oz (45.6 kg)   06/20/19 101 lb 9.6 oz (46.1 kg)     Body mass index is 18.47 kg/m .        Physical Exam:  GENERAL APPEARANCE: A&A, NAD, well hydrated, well nourished  CV: RRR, no M/G/R   LUNGS: CTAB, normal respiratory effort  SKIN:  Normal skin turgor, no lesions/rashes, moderate amount of acne on face, no redness or significant swelling noted, no comdones present, warm and dry    PSYCHIATRIC;  Mood appropriate, memory intact, good eye contact, engaged in conversation

## 2021-06-01 NOTE — TELEPHONE ENCOUNTER
RN cannot approve Refill Request    RN can NOT refill this medication Protocol failed and NO refill given. Last office visit: 4/8/2019 Naima Martinez NP Last Physical: 8/27/2019 Last MTM visit: Visit date not found Last visit same specialty: 5/8/2019 Karla Boudreaux MD.  Next visit within 3 mo: Visit date not found  Next physical within 3 mo: Visit date not found      Elysia Hidalgo, [facility] Triage/Med Refill 9/21/2019    Requested Prescriptions   Pending Prescriptions Disp Refills     escitalopram oxalate (LEXAPRO) 10 MG tablet [Pharmacy Med Name: ESCITALOPRAM 10 MG TABLET] 30 tablet 0     Sig: TAKE 1 TABLET BY MOUTH EVERY DAY       [facility] Refill Protocol  Failed - 9/21/2019  9:07 AM        Failed - Age 21 and younger route to prescribing provider     Last office visit with prescriber/PCP: 4/8/2019 Naima Martinez NP OR same dept: 5/8/2019 Karla Boudreaux MD OR same specialty: 5/8/2019 Karla Boudreaux MD  Last physical: 8/27/2019 Last MTM visit: Visit date not found   Next visit within 3 mo: Visit date not found  Next physical within 3 mo: Visit date not found  Prescriber OR PCP: Naima Martinez NP  Last diagnosis associated with med order: 1. Anxiety  - escitalopram oxalate (LEXAPRO) 10 MG tablet [Pharmacy Med Name: ESCITALOPRAM 10 MG TABLET]; TAKE 1 TABLET BY MOUTH EVERY DAY  Dispense: 30 tablet; Refill: 0    2. Moderate episode of recurrent major depressive disorder (H)  - escitalopram oxalate (LEXAPRO) 10 MG tablet [Pharmacy Med Name: ESCITALOPRAM 10 MG TABLET]; TAKE 1 TABLET BY MOUTH EVERY DAY  Dispense: 30 tablet; Refill: 0    If protocol passes may refill for 12 months if within 3 months of last provider visit (or a total of 15 months).             Passed - PCP or prescribing provider visit in last year     Last office visit with prescriber/PCP: 4/8/2019 Naima Martinez NP OR same dept: 5/8/2019 Karla Boudreaux MD OR same specialty: 5/8/2019 Karla Boudreaux  MD Stevo  Last physical: 8/27/2019 Last MTM visit: Visit date not found   Next visit within 3 mo: Visit date not found  Next physical within 3 mo: Visit date not found  Prescriber OR PCP: Naima Martinez NP  Last diagnosis associated with med order: 1. Anxiety  - escitalopram oxalate (LEXAPRO) 10 MG tablet [Pharmacy Med Name: ESCITALOPRAM 10 MG TABLET]; TAKE 1 TABLET BY MOUTH EVERY DAY  Dispense: 30 tablet; Refill: 0    2. Moderate episode of recurrent major depressive disorder (H)  - escitalopram oxalate (LEXAPRO) 10 MG tablet [Pharmacy Med Name: ESCITALOPRAM 10 MG TABLET]; TAKE 1 TABLET BY MOUTH EVERY DAY  Dispense: 30 tablet; Refill: 0    If protocol passes may refill for 12 months if within 3 months of last provider visit (or a total of 15 months).

## 2021-06-02 VITALS — HEIGHT: 62 IN | BODY MASS INDEX: 19.14 KG/M2 | WEIGHT: 104 LBS

## 2021-06-02 VITALS — BODY MASS INDEX: 19.51 KG/M2 | WEIGHT: 106 LBS | HEIGHT: 62 IN

## 2021-06-02 VITALS — BODY MASS INDEX: 20.08 KG/M2 | WEIGHT: 109.13 LBS | HEIGHT: 62 IN

## 2021-06-02 NOTE — TELEPHONE ENCOUNTER
Refill Approved    Rx renewed per Medication Renewal Policy. Medication was last renewed on 8/27/19.    Annelise Garcia, [facility] Connection Triage/Med Refill 10/23/2019     Requested Prescriptions   Pending Prescriptions Disp Refills     norgestimate-ethinyl estradiol (ORTHO-CYCLEN) 0.25-35 mg-mcg per tablet 3 Package 0     Sig: Take 1 tablet by mouth daily.       Oral Contraceptives Protocol Passed - 10/23/2019  7:01 PM        Passed - Visit with PCP or prescribing provider visit in last 12 months      Last office visit with prescriber/PCP: 9/24/2019 Naima Martinez NP OR same dept: 9/24/2019 Naima Martinez NP OR same specialty: 9/24/2019 Naima Martinez NP  Last physical: 8/27/2019 Last MTM visit: Visit date not found   Next visit within 3 mo: Visit date not found  Next physical within 3 mo: Visit date not found  Prescriber OR PCP: Naima Martinez NP  Last diagnosis associated with med order: 1. Acne vulgaris  - norgestimate-ethinyl estradiol (ORTHO-CYCLEN) 0.25-35 mg-mcg per tablet; Take 1 tablet by mouth daily.  Dispense: 3 Package; Refill: 0    If protocol passes may refill for 12 months if within 3 months of last provider visit (or a total of 15 months).

## 2021-06-03 VITALS
WEIGHT: 101 LBS | DIASTOLIC BLOOD PRESSURE: 62 MMHG | HEIGHT: 62 IN | RESPIRATION RATE: 14 BRPM | TEMPERATURE: 98.2 F | BODY MASS INDEX: 18.58 KG/M2 | OXYGEN SATURATION: 99 % | SYSTOLIC BLOOD PRESSURE: 100 MMHG | HEART RATE: 63 BPM

## 2021-06-03 VITALS — HEIGHT: 62 IN | WEIGHT: 100.5 LBS | BODY MASS INDEX: 18.5 KG/M2

## 2021-06-03 VITALS — BODY MASS INDEX: 18.87 KG/M2 | WEIGHT: 104 LBS

## 2021-06-03 VITALS — WEIGHT: 101.6 LBS | BODY MASS INDEX: 18.43 KG/M2

## 2021-06-03 VITALS — WEIGHT: 106 LBS | BODY MASS INDEX: 19.23 KG/M2

## 2021-06-04 VITALS
TEMPERATURE: 99 F | WEIGHT: 99.44 LBS | DIASTOLIC BLOOD PRESSURE: 60 MMHG | HEART RATE: 71 BPM | HEIGHT: 62 IN | SYSTOLIC BLOOD PRESSURE: 106 MMHG | BODY MASS INDEX: 18.3 KG/M2 | RESPIRATION RATE: 14 BRPM | OXYGEN SATURATION: 99 %

## 2021-06-04 VITALS — WEIGHT: 102.2 LBS | BODY MASS INDEX: 18.69 KG/M2

## 2021-06-04 VITALS — WEIGHT: 98 LBS | BODY MASS INDEX: 17.92 KG/M2

## 2021-06-05 NOTE — TELEPHONE ENCOUNTER
Refill Approved    Rx renewed per Medication Renewal Policy. Medication was last renewed on 10/23/19.    Sammie Garcia, [facility] Connection Triage/Med Refill 1/21/2020     Requested Prescriptions   Pending Prescriptions Disp Refills     SPRINTEC, 28, 0.25-35 mg-mcg per tablet [Pharmacy Med Name: SPRINTEC 28 DAY TABLET] 84 tablet 0     Sig: TAKE 1 TABLET BY MOUTH EVERY DAY       Oral Contraceptives Protocol Passed - 1/21/2020  1:21 AM        Passed - Visit with PCP or prescribing provider visit in last 12 months      Last office visit with prescriber/PCP: 5/8/2019 Karla Boudreaux MD OR same dept: 9/24/2019 Naima Martinez NP OR same specialty: 9/24/2019 Naima Martinez NP  Last physical: Visit date not found Last MTM visit: Visit date not found   Next visit within 3 mo: Visit date not found  Next physical within 3 mo: Visit date not found  Prescriber OR PCP: Karla Boudreaux MD  Last diagnosis associated with med order: 1. Acne vulgaris  - SPRINTEC, 28, 0.25-35 mg-mcg per tablet [Pharmacy Med Name: SPRINTEC 28 DAY TABLET]; TAKE 1 TABLET BY MOUTH EVERY DAY  Dispense: 84 tablet; Refill: 0    If protocol passes may refill for 12 months if within 3 months of last provider visit (or a total of 15 months).

## 2021-06-05 NOTE — PROGRESS NOTES
1. Birth control counseling  drospirenone-ethinyl estradiol (CODY) 3-0.02 mg per tablet   2. Anxiety  PHQ9 Depression Screen    escitalopram oxalate (LEXAPRO) 10 MG tablet   3. Severe episode of recurrent major depressive disorder, without psychotic features (H)  PHQ9 Depression Screen    escitalopram oxalate (LEXAPRO) 10 MG tablet     PHQ-9:  13  KWABENA-7: 7    ASSESSMENT/PLAN:     Body Mass Index was not assessed due to normal.    1. Birth control counseling    - drospirenone-ethinyl estradiol (CODY) 3-0.02 mg per tablet; Take 1 tablet by mouth daily.  Dispense: 3 Package; Refill: 0  -If she feels that her periods have been more regular with less bleeding, she will request her next refill in 3 months.  If she continues to have problems with no longer bleeding, she will return to the clinic and discuss this with another provider while I am on medical leave.    2. Anxiety    - PHQ9 Depression Screen  - escitalopram oxalate (LEXAPRO) 10 MG tablet; Take 1.5 tablets (15 mg total) by mouth daily.  Dispense: 45 tablet; Refill: 2  -I will see her again in 3 months to discuss her recent dose change, if she is having a difficult time with a dose change, she was advised to return to the clinic to meet with 1 of my partners while I am out on medical leave.  -She should continue working on healthy coping strategies  -Continue with regular exercise  -Continue with healthy diet, we did discuss introducing vitamin B12 500 MCG daily due to her choice of vegetarian diet, she is currently not supplementing with this at this time    3. Severe episode of recurrent major depressive disorder, without psychotic features (H)    - PHQ9 Depression Screen  - escitalopram oxalate (LEXAPRO) 10 MG tablet; Take 1.5 tablets (15 mg total) by mouth daily.  Dispense: 45 tablet; Refill: 2  I will see her again in 3 months to discuss her recent dose change, if she is having a difficult time with a dose change, she was advised to return to the clinic to  meet with 1 of my partners while I am out on medical leave.  -She should continue working on healthy coping strategies  -Continue with regular exercise  -Continue with healthy diet, we did discuss introducing vitamin B12 500 MCG daily due to her choice of vegetarian diet, she is currently not supplementing with this at this time        There are no Patient Instructions on file for this visit.  Medications Discontinued During This Encounter   Medication Reason     SPRINTEC, 28, 0.25-35 mg-mcg per tablet Side effects     escitalopram oxalate (LEXAPRO) 10 MG tablet Dose adjustment     Return in about 3 months (around 4/30/2020) for anxiety, depression, birth control.       Naima Martinez NP           SUBJECTIVE:  Rosette Montero is a 19 y.o. female who presents to discuss her contraception, anxiety and depression.    Contraception: Currently taking Sprintec, she was previously taking MonoNessa but she continues to have prolonged periods now for the last 6 months.  The first day of her last period started on January 12, she continues to have bleeding.  Bleeding goes from bright red to thick and brown.  Her acne has improved since she started the oral contraception but she continues to struggle with having lighter periods that are shorter in duration.  She is sexually active, they are not using condoms at this time.  She has no concerns for pregnancy.  She would like to discuss other options for contraception in order to control her bleeding and to decrease the amount of bleeding she is having.  He has not been feeling symptomatic from the amount of bleeding she is been having, she denies lightheadedness or dizziness today.  She does continue to feel fatigued in the midday, will typically take a nap when she gets home from work which is something she has always done.     Anxiety/depression: Current medication: Lexapro 10 mg daily.  She did want to discuss increasing her dose by another half tablet to see if she can  get a better handle on her anxiety and depression.  Overall, she feels that her anxiety and depression have drastically decreased in starting the medication and she has improved energy throughout the day.  Her naps have definitely been shorter and she is not having any more sleep paralysis.  She feels her attention span has improved and she is able to handle the stress at work much better.  She has had a few panic attacks and started the medication but nothing near the degree of what she was experiencing prior to starting medication.  Is not seeing a counselor, she denies suicidal ideation.  Coping strategies include exercising, reading, painting and playing the piano.   Chief Complaint   Patient presents with     Contraception     Anxiety         Patient Active Problem List   Diagnosis     Acne vulgaris     Severe episode of recurrent major depressive disorder, without psychotic features (H)     Anxiety       Current Outpatient Medications   Medication Sig Dispense Refill     cetirizine (ZYRTEC) 10 MG tablet Take 10 mg by mouth daily.       diphenhydrAMINE (BENADRYL) 25 mg capsule Take 25 mg by mouth every 6 (six) hours as needed for itching.       drospirenone-ethinyl estradiol (CODY) 3-0.02 mg per tablet Take 1 tablet by mouth daily. 3 Package 0     escitalopram oxalate (LEXAPRO) 10 MG tablet Take 1.5 tablets (15 mg total) by mouth daily. 45 tablet 2     FA/mv,Ca,iron,min/lycopene/lut (MULTIVITAL ORAL) Take by mouth.       ketoconazole (NIZORAL) 2 % shampoo Apply topically.       Lactobacillus acidophilus (PROBIOTIC ORAL) Take by mouth.       meclizine (ANTIVERT) 25 mg tablet Take 25 mg by mouth.       tretinoin (RETIN-A) 0.05 % cream Apply topically.       No current facility-administered medications for this visit.        Social History     Tobacco Use   Smoking Status Never Smoker   Smokeless Tobacco Never Used       REVIEW OF SYSTEMS: Denies excessive worries, restlessness, on edge, easily fatigues,  irritability, disturbed sleep, decreased concentration, muscle tension, fear of health.      TOBACCO USE:  Social History     Tobacco Use   Smoking Status Never Smoker   Smokeless Tobacco Never Used     Social History     Socioeconomic History     Marital status: Single     Spouse name: Not on file     Number of children: Not on file     Years of education: Not on file     Highest education level: Not on file   Occupational History     Not on file   Social Needs     Financial resource strain: Not on file     Food insecurity:     Worry: Not on file     Inability: Not on file     Transportation needs:     Medical: Not on file     Non-medical: Not on file   Tobacco Use     Smoking status: Never Smoker     Smokeless tobacco: Never Used   Substance and Sexual Activity     Alcohol use: Not on file     Drug use: Not on file     Sexual activity: Not on file   Lifestyle     Physical activity:     Days per week: Not on file     Minutes per session: Not on file     Stress: Not on file   Relationships     Social connections:     Talks on phone: Not on file     Gets together: Not on file     Attends Mandaen service: Not on file     Active member of club or organization: Not on file     Attends meetings of clubs or organizations: Not on file     Relationship status: Not on file     Intimate partner violence:     Fear of current or ex partner: Not on file     Emotionally abused: Not on file     Physically abused: Not on file     Forced sexual activity: Not on file   Other Topics Concern     Not on file   Social History Narrative     Not on file         OBJECTIVE:            There were no vitals filed for this visit.  Weight: 102 lb 3.2 oz (46.4 kg)    Wt Readings from Last 3 Encounters:   01/30/20 102 lb 3.2 oz (46.4 kg)   09/24/19 101 lb (45.8 kg)   08/27/19 100 lb 8 oz (45.6 kg)     Body mass index is 18.69 kg/m .        Physical Exam:  GENERAL APPEARANCE: A&A, NAD, well hydrated, well nourished  CV: RRR, no M/G/R   LUNGS:  CTAB, normal respiratory effort  ABDOMEN: S&NT, no masses, no organomegaly, BS present x4   SKIN:  Normal skin turgor, no lesions/rashes, mild acne on face,  warm and dry   PSYCHIATRIC;  Mood appropriate, memory intact, good eye contact, engaged in conversation

## 2021-06-07 NOTE — TELEPHONE ENCOUNTER
RN cannot approve Refill Request    RN can NOT refill this medication med is not covered by policy/route to provider. Last office visit: 1/30/2020 Naima Martinez NP Last Physical: 8/27/2019 Last MTM visit: Visit date not found Last visit same specialty: 1/30/2020 Naima Martinez NP.  Next visit within 3 mo: Visit date not found  Next physical within 3 mo: Visit date not found      Margareth Garcia, [facility] Connection Triage/Med Refill 3/22/2020    Requested Prescriptions   Pending Prescriptions Disp Refills     escitalopram oxalate (LEXAPRO) 10 MG tablet [Pharmacy Med Name: ESCITALOPRAM 10 MG TABLET] 45 tablet 2     Sig: TAKE 1 AND 1/2 TABLETS DAILY BY MOUTH       [facility] Refill Protocol  Failed - 3/22/2020 10:35 AM        Failed - Age 21 and younger route to prescribing provider     Last office visit with prescriber/PCP: 1/30/2020 Naima Martinez NP OR same dept: 1/30/2020 Naima Martinez NP OR same specialty: 1/30/2020 Naima Martinez NP  Last physical: 8/27/2019 Last MTM visit: Visit date not found   Next visit within 3 mo: Visit date not found  Next physical within 3 mo: Visit date not found  Prescriber OR PCP: Naima Martinez NP  Last diagnosis associated with med order: 1. Anxiety  - escitalopram oxalate (LEXAPRO) 10 MG tablet [Pharmacy Med Name: ESCITALOPRAM 10 MG TABLET]; TAKE 1 AND 1/2 TABLETS DAILY BY MOUTH  Dispense: 45 tablet; Refill: 2    2. Severe episode of recurrent major depressive disorder, without psychotic features (H)  - escitalopram oxalate (LEXAPRO) 10 MG tablet [Pharmacy Med Name: ESCITALOPRAM 10 MG TABLET]; TAKE 1 AND 1/2 TABLETS DAILY BY MOUTH  Dispense: 45 tablet; Refill: 2    If protocol passes may refill for 12 months if within 3 months of last provider visit (or a total of 15 months).             Passed - PCP or prescribing provider visit in last year     Last office visit with prescriber/PCP: 1/30/2020 Naima Martinez NP OR same dept: 1/30/2020 Juan  Naima BLAKE NP OR same specialty: 1/30/2020 Naima Martinez NP  Last physical: 8/27/2019 Last MTM visit: Visit date not found   Next visit within 3 mo: Visit date not found  Next physical within 3 mo: Visit date not found  Prescriber OR PCP: Naima Martinez NP  Last diagnosis associated with med order: 1. Anxiety  - escitalopram oxalate (LEXAPRO) 10 MG tablet [Pharmacy Med Name: ESCITALOPRAM 10 MG TABLET]; TAKE 1 AND 1/2 TABLETS DAILY BY MOUTH  Dispense: 45 tablet; Refill: 2    2. Severe episode of recurrent major depressive disorder, without psychotic features (H)  - escitalopram oxalate (LEXAPRO) 10 MG tablet [Pharmacy Med Name: ESCITALOPRAM 10 MG TABLET]; TAKE 1 AND 1/2 TABLETS DAILY BY MOUTH  Dispense: 45 tablet; Refill: 2    If protocol passes may refill for 12 months if within 3 months of last provider visit (or a total of 15 months).

## 2021-06-07 NOTE — TELEPHONE ENCOUNTER
RN cannot approve Refill Request    RN can NOT refill this medication med is not covered by policy/route to provider. Last office visit: 5/8/2019 Karla Boudreaux MD Last Physical: Visit date not found Last MTM visit: Visit date not found Last visit same specialty: 1/30/2020 Naima Martinez NP.  Next visit within 3 mo: Visit date not found  Next physical within 3 mo: Visit date not found      Sandie Larson, [facility] Connection Triage/Med Refill 4/23/2020    Requested Prescriptions   Pending Prescriptions Disp Refills     escitalopram oxalate (LEXAPRO) 10 MG tablet [Pharmacy Med Name: ESCITALOPRAM 10 MG TABLET] 45 tablet 0     Sig: TAKE 1 AND 1/2 TABLETS BY MOUTH DAILY. APPOINTMENT NEEDED BEFORE NEXT REFILL       [facility] Refill Protocol  Failed - 4/23/2020 12:21 AM        Failed - Age 21 and younger route to prescribing provider     Last office visit with prescriber/PCP: 5/8/2019 Karla Boudreaux MD OR same dept: 1/30/2020 Naima Martinez NP OR same specialty: 1/30/2020 Naima Martinez NP  Last physical: Visit date not found Last MTM visit: Visit date not found   Next visit within 3 mo: Visit date not found  Next physical within 3 mo: Visit date not found  Prescriber OR PCP: Karla Boudreaux MD  Last diagnosis associated with med order: 1. Anxiety  - escitalopram oxalate (LEXAPRO) 10 MG tablet [Pharmacy Med Name: ESCITALOPRAM 10 MG TABLET]; TAKE 1 AND 1/2 TABLETS BY MOUTH DAILY. APPOINTMENT NEEDED BEFORE NEXT REFILL  Dispense: 45 tablet; Refill: 0    2. Severe episode of recurrent major depressive disorder, without psychotic features (H)  - escitalopram oxalate (LEXAPRO) 10 MG tablet [Pharmacy Med Name: ESCITALOPRAM 10 MG TABLET]; TAKE 1 AND 1/2 TABLETS BY MOUTH DAILY. APPOINTMENT NEEDED BEFORE NEXT REFILL  Dispense: 45 tablet; Refill: 0    If protocol passes may refill for 12 months if within 3 months of last provider visit (or a total of 15 months).             Passed - PCP or  prescribing provider visit in last year     Last office visit with prescriber/PCP: 5/8/2019 Karla Boudreaux MD OR same dept: 1/30/2020 Naima Martinez NP OR same specialty: 1/30/2020 Naima Martinez NP  Last physical: Visit date not found Last MTM visit: Visit date not found   Next visit within 3 mo: Visit date not found  Next physical within 3 mo: Visit date not found  Prescriber OR PCP: Karla Boudreaux MD  Last diagnosis associated with med order: 1. Anxiety  - escitalopram oxalate (LEXAPRO) 10 MG tablet [Pharmacy Med Name: ESCITALOPRAM 10 MG TABLET]; TAKE 1 AND 1/2 TABLETS BY MOUTH DAILY. APPOINTMENT NEEDED BEFORE NEXT REFILL  Dispense: 45 tablet; Refill: 0    2. Severe episode of recurrent major depressive disorder, without psychotic features (H)  - escitalopram oxalate (LEXAPRO) 10 MG tablet [Pharmacy Med Name: ESCITALOPRAM 10 MG TABLET]; TAKE 1 AND 1/2 TABLETS BY MOUTH DAILY. APPOINTMENT NEEDED BEFORE NEXT REFILL  Dispense: 45 tablet; Refill: 0    If protocol passes may refill for 12 months if within 3 months of last provider visit (or a total of 15 months).

## 2021-06-07 NOTE — TELEPHONE ENCOUNTER
Naima wanted patient to follow-up at the end of this month to review her Lexapro dosing.  Can we please see about scheduling an appointment?    Maury Camilo, CNP

## 2021-06-07 NOTE — PROGRESS NOTES
"Rosette Montero is a 19 y.o. female who is being evaluated via a billable telephone visit.      The patient has been notified of following:     \"This telephone visit will be conducted via a call between you and your physician/provider. We have found that certain health care needs can be provided without the need for a physical exam.  This service lets us provide the care you need with a short phone conversation.  If a prescription is necessary we can send it directly to your pharmacy.  If lab work is needed we can place an order for that and you can then stop by our lab to have the test done at a later time.    Telephone visits are billed at different rates depending on your insurance coverage. During this emergency period, for some insurers they may be billed the same as an in-person visit.  Please reach out to your insurance provider with any questions.    If during the course of the call the physician/provider feels a telephone visit is not appropriate, you will not be charged for this service.\"    Patient has given verbal consent to a Telephone visit? Yes    Patient would like to receive their AVS by AVS Preference: Pratibha.    Additional provider notes: Med check    Anxiety/depression: Current medication: Lexapro 15 mg daily. Well controlled.  She describes feeling more motivated since starting the medication. She is able to complete tasks easier than she was prior to starting medication.  She does take the medication in the morning, she has noticed recently that she is unable to sit still and feels \"depersonalized\" and wonders if it is due to the current dose she is on.  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  She does continue to exercise and long board for stress relief along with hanging out with friends and drinking alcohol socially.  She is not seeing a counselor at this time " "but this is something she is interested in doing once the virus restrictions have been lifted. Denies suicidal thoughts or self harm today. She is no longer experiencing fatigue like she was when she first started the medication.     Heavy periods/acne: Her menstrual cycles are now regular and typically last 3 to 4 days and are lighter in flow. She denies experiencing breakthrough bleeding or missing any doses.  She has noticed a significant improvement in her acne since starting the [place].      1. Anxiety  PHQ9 Depression Screen    escitalopram oxalate (LEXAPRO) 10 MG tablet   2. Moderate episode of recurrent major depressive disorder (H)  PHQ9 Depression Screen    escitalopram oxalate (LEXAPRO) 10 MG tablet   3. Acne vulgaris  drospirenone-ethinyl estradioL (CODY) 3-0.02 mg per tablet   4. Birth control counseling  drospirenone-ethinyl estradioL (CODY) 3-0.02 mg per tablet         Assessment/Plan:  1. Anxiety    - PHQ9 Depression Screen  - escitalopram oxalate (LEXAPRO) 10 MG tablet; TAKE 1 AND 1/2 TABLETS DAILY BY MOUTH  Dispense: 135 tablet; Refill: 1  -she will play around with the dose and try taking 15 mg every other day and 10 mg on the other days  -if she still feels \"out of it\" on this dosing trial, she will message me and we will try a different medication all together  KWABENA-7: 9  -she will look into mental health counseling once the COVID restrictions are lifted, she is under her parents insurance plan so she will plan on calling her insurance to find out who is in network.     2. Moderate episode of recurrent major depressive disorder (H)    - PHQ9 Depression Screen  - escitalopram oxalate (LEXAPRO) 10 MG tablet; TAKE 1 AND 1/2 TABLETS DAILY BY MOUTH  Dispense: 135 tablet; Refill: 1  she will play around with the dose and try taking 15 mg every other day and 10 mg on the other days  -if she still feels \"out of it\" on this dosing trial, she will message me and we will try a different medication all " together  -PHQ-9: 13    3. Acne vulgaris    - drospirenone-ethinyl estradioL (CODY) 3-0.02 mg per tablet; Take 1 tablet by mouth daily.  Dispense: 3 Package; Refill: 1  -well controlled on birth control    4. Birth control counseling    - drospirenone-ethinyl estradioL (CODY) 3-0.02 mg per tablet; Take 1 tablet by mouth daily.  Dispense: 3 Package; Refill: 1  -periods are well controlled and now regular using CODY birth control        Phone call duration:  19 minutes    Naima Martinez NP

## 2021-06-07 NOTE — TELEPHONE ENCOUNTER
Pt scheduled phone visit for Monday 4/27/20. She reports she has enough medication until then..H.DeGree, CMA

## 2021-06-08 NOTE — PROGRESS NOTES
"Rosette Montero is a 19 y.o. female who is being evaluated via a billable telephone visit.      The patient has been notified of following:     \"This telephone visit will be conducted via a call between you and your physician/provider. We have found that certain health care needs can be provided without the need for a physical exam.  This service lets us provide the care you need with a short phone conversation.  If a prescription is necessary we can send it directly to your pharmacy.  If lab work is needed we can place an order for that and you can then stop by our lab to have the test done at a later time.    Telephone visits are billed at different rates depending on your insurance coverage. During this emergency period, for some insurers they may be billed the same as an in-person visit.  Please reach out to your insurance provider with any questions.    If during the course of the call the physician/provider feels a telephone visit is not appropriate, you will not be charged for this service.\"    Patient has given verbal consent to a Telephone visit? Yes    What phone number would you like to be contacted at?   [phone]   Patient would like to receive their AVS by AVS Preference: Pratibha.    Additional provider notes: Calling to request STD testing and reporting vaginal discharge x1 month. Her discharge has been thicker and white with some yellow in it. She denies a foul odor with this today. She does have a history of recurrent vaginal yeast infections and is aware of what triggers these so she is very cautious about what types of products she uses. Her and her BF of 3 years just broke up and she wonders about if he was loyal to her so she is wanting some STD testing. Denies flu like symptoms, abdominal or low back pain today. Denies having pain anywhere. She does not take baths, showers only. LMP was 2 week ago, her cycles are regular and last 5 days. She is on oral contraception.     1. Vaginal discharge  " fluconazole (DIFLUCAN) 150 MG tablet   2. Possible exposure to STD  Chlamydia trachomatis & Neisseria gonorrhoeae, Amplified Detection         Assessment/Plan:  1. Vaginal discharge    - fluconazole (DIFLUCAN) 150 MG tablet; Take 1 tablet (150 mg total) by mouth once for 1 dose.  Dispense: 1 tablet; Refill: 0  -due to her recurrent vaginal yeast infections, will treat conservatively today  -if she continue to have discharge after completing the medication, she will follow back up to discuss    2. Possible exposure to STD    -recent break up from her BF of 3 years, she is uncertain if he was messing around on her  - Chlamydia trachomatis & Neisseria gonorrhoeae, Amplified Detection; Future  -she declined HIV screening        Phone call duration:  12 minutes    Naima Martinez NP

## 2021-06-11 NOTE — PROGRESS NOTES
1. Vaginal discharge  Wet Prep, Vaginal   2. Yeast infection of the vagina  fluconazole (DIFLUCAN) 150 MG tablet   3. Bacterial vaginitis  metroNIDAZOLE (FLAGYL) 500 MG tablet         ASSESSMENT/PLAN:     The following low BMI interventions were performed this visit: weight gain advised    1. Vaginal discharge    - Wet Prep, Vaginal    2. 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   -watch sugar and carbohydrate intake  -discussed condom use with all new partners    3. Bacterial vaginitis    - metroNIDAZOLE (FLAGYL) 500 MG tablet; Take 1 tablet (500 mg total) by mouth 3 (three) times a day for 7 days.  Dispense: 21 tablet; Refill: 0  -if she continues to have issues after antibiotic therapy, she will return for STD testing, declined this today      There are no Patient Instructions on file for this visit.  Medications Discontinued During This Encounter   Medication Reason     Lactobacillus acidophilus (PROBIOTIC ORAL) Therapy completed     ketoconazole (NIZORAL) 2 % shampoo Therapy completed     Return in about 2 weeks (around 9/25/2020), or if symptoms worsen or fail to improve.          Naima Martinez NP          SUBJECTIVE:  Rosette Montero is a 20 y.o. female presents for evaluation of her vaginal discharge.  She noticed her discharge starting yesterday and it was grayish-brown in color.  She noticed some foul odor coming from the vaginal region as well but denies any itching or pain.  She has a history of recurrent vaginal yeast and bacterial infection so she is extremely cautious with using any sort of scented products, she has not changed any of her soaps or detergents.  She only takes showers and avoids bathing because of this.  She  does wear tight fitting yoga pants to work, she is working at a gym, she does wear thongs daily for undergarments.  She denies any urinary changes, no recent fevers.  She is no longer taking her daily probiotic.  She admits to having a diet rich in sugar, carbohydrates and dairy, she does have a lower body weight/BMI so she tends to eat more sugary and high carbohydrate foods in order to gain weight.  She did have a new sexual partner and engaged in sexual intercourse without a condom about 4 days ago.  Last menstrual period was about 3 weeks ago.  She is not concerned with STDs at this time, she did decline the testing today.  She is due for HPV and influenza vaccines along with her tetanus, she declined all of these today as well.   Chief Complaint   Patient presents with     Vaginitis     colored discharge, ordor x 1 day - unprtected sex 3-4 days         Patient Active Problem List   Diagnosis     Acne vulgaris     Severe episode of recurrent major depressive disorder, without psychotic features (H)     Anxiety       Current Outpatient Medications   Medication Sig Dispense Refill     cetirizine (ZYRTEC) 10 MG tablet Take 10 mg by mouth daily.       diphenhydrAMINE (BENADRYL) 25 mg capsule Take 25 mg by mouth every 6 (six) hours as needed for itching.       drospirenone-ethinyl estradioL (CODY) 3-0.02 mg per tablet Take 1 tablet by mouth daily. 3 Package 1     escitalopram oxalate (LEXAPRO) 10 MG tablet TAKE 1 AND 1/2 TABLETS DAILY BY MOUTH 135 tablet 1     FA/mv,Ca,iron,min/lycopene/lut (MULTIVITAL ORAL) Take by mouth.       fluconazole (DIFLUCAN) 150 MG tablet Take one tablet today, may repeat x1 dose 1 tablet 1     meclizine (ANTIVERT) 25 mg tablet Take 25 mg by mouth.       metroNIDAZOLE (FLAGYL) 500 MG tablet Take 1 tablet (500 mg total) by mouth 3 (three) times a day for 7 days. 21 tablet 0     No current facility-administered medications for this visit.        Social History     Tobacco Use   Smoking Status  Never Smoker   Smokeless Tobacco Never Used       REVIEW OF SYSTEMS: Denies dysuria, frequency, urgency, fevers, chills, back pain, nausea, vomiting or abdominal pain.      TOBACCO USE:  Social History     Tobacco Use   Smoking Status Never Smoker   Smokeless Tobacco Never Used     Social History     Socioeconomic History     Marital status: Single     Spouse name: Not on file     Number of children: Not on file     Years of education: Not on file     Highest education level: Not on file   Occupational History     Not on file   Social Needs     Financial resource strain: Not on file     Food insecurity     Worry: Not on file     Inability: Not on file     Transportation needs     Medical: Not on file     Non-medical: Not on file   Tobacco Use     Smoking status: Never Smoker     Smokeless tobacco: Never Used   Substance and Sexual Activity     Alcohol use: Not on file     Drug use: Not on file     Sexual activity: Not on file   Lifestyle     Physical activity     Days per week: Not on file     Minutes per session: Not on file     Stress: Not on file   Relationships     Social connections     Talks on phone: Not on file     Gets together: Not on file     Attends Yarsani service: Not on file     Active member of club or organization: Not on file     Attends meetings of clubs or organizations: Not on file     Relationship status: Not on file     Intimate partner violence     Fear of current or ex partner: Not on file     Emotionally abused: Not on file     Physically abused: Not on file     Forced sexual activity: Not on file   Other Topics Concern     Not on file   Social History Narrative     Not on file         OBJECTIVE:            Vitals:    09/11/20 1308   BP: 106/60   Pulse: 71   Resp: 14   Temp: 99  F (37.2  C)   SpO2: 99%     Weight: 99 lb 7 oz (45.1 kg)    Wt Readings from Last 3 Encounters:   09/11/20 99 lb 7 oz (45.1 kg)   05/14/20 (!) 98 lb (44.5 kg)   01/30/20 102 lb 3.2 oz (46.4 kg)     Body mass index  is 18.19 kg/m .        Physical Exam:  GENERAL APPEARANCE: A&A, NAD, well hydrated, well nourished  MOUTH: without erythema, exudate or thrush  NECK: Supple, without lymphadenopathy, no thyroid mass, no JVD, no bruit  CV: RRR, no M/G/R   LUNGS: CTAB, normal respiratory effort  ABDOMEN: S&NT, no masses, no organomegaly, BS present x4, no CVA tenderness  GENITAL: External genitalia is negative for redness, swelling, drainage or sores.  Internal pelvic exam reveals moderate amount of thick, yellow discharge, foul odor is noted.  Cervix is pink and smooth, os is closed.  No signs of bleeding, tenderness, masses or sores.  SKIN:  Normal skin turgor, no lesions/rashes, warm and dry

## 2021-06-11 NOTE — TELEPHONE ENCOUNTER
Refill Approved    Rx renewed per Medication Renewal Policy. Medication was last renewed on 4/27/20.    Sammie Garcia, [facility] Triage/Med Refill 9/15/2020     Requested Prescriptions   Pending Prescriptions Disp Refills     escitalopram oxalate (LEXAPRO) 10 MG tablet 135 tablet 1     Sig: TAKE 1 AND 1/2 TABLETS DAILY BY MOUTH       [facility] Refill Protocol  Passed - 9/14/2020 10:24 AM        Passed - PCP or prescribing provider visit in last year     Last office visit with prescriber/PCP: 5/8/2019 Karla Boudreaux MD OR same dept: 9/11/2020 Naima Martinez NP OR same specialty: 9/11/2020 Naima Martinez NP  Last physical: Visit date not found Last MTM visit: Visit date not found   Next visit within 3 mo: Visit date not found  Next physical within 3 mo: Visit date not found  Prescriber OR PCP: Karla Boudreaux MD  Last diagnosis associated with med order: 1. Anxiety  - escitalopram oxalate (LEXAPRO) 10 MG tablet; TAKE 1 AND 1/2 TABLETS DAILY BY MOUTH  Dispense: 135 tablet; Refill: 1    2. Moderate episode of recurrent major depressive disorder (H)  - escitalopram oxalate (LEXAPRO) 10 MG tablet; TAKE 1 AND 1/2 TABLETS DAILY BY MOUTH  Dispense: 135 tablet; Refill: 1    If protocol passes may refill for 12 months if within 3 months of last provider visit (or a total of 15 months).

## 2021-06-12 NOTE — TELEPHONE ENCOUNTER
Refill Approved    Rx renewed per Medication Renewal Policy. Medication was last renewed on 4/27/20.    Sammie Garcia, [facility] Triage/Med Refill 10/8/2020     Requested Prescriptions   Pending Prescriptions Disp Refills     GIANVI, 28, 3-0.02 mg per tablet [Pharmacy Med Name: GIANVI 3 MG-0.02 MG TABLET] 84 tablet 1     Sig: TAKE 1 TABLET BY MOUTH EVERY DAY       Oral Contraceptives Protocol Passed - 10/6/2020 12:26 AM        Passed - Visit with PCP or prescribing provider visit in last 12 months      Last office visit with prescriber/PCP: 9/11/2020 Naima Martinez NP OR same dept: 9/11/2020 Naima Martinez NP OR same specialty: 9/11/2020 Naima Martinez NP  Last physical: 8/27/2019 Last MTM visit: Visit date not found   Next visit within 3 mo: Visit date not found  Next physical within 3 mo: Visit date not found  Prescriber OR PCP: Naima Martinez NP  Last diagnosis associated with med order: 1. Birth control counseling  - GIANVI, 28, 3-0.02 mg per tablet [Pharmacy Med Name: GIANVI 3 MG-0.02 MG TABLET]; TAKE 1 TABLET BY MOUTH EVERY DAY  Dispense: 84 tablet; Refill: 1    2. Acne vulgaris  - GIANVI, 28, 3-0.02 mg per tablet [Pharmacy Med Name: GIANVI 3 MG-0.02 MG TABLET]; TAKE 1 TABLET BY MOUTH EVERY DAY  Dispense: 84 tablet; Refill: 1    If protocol passes may refill for 12 months if within 3 months of last provider visit (or a total of 15 months).

## 2021-06-14 NOTE — PROGRESS NOTES
Rosette Montero is a 20 y.o. female who is being evaluated via a billable video visit.      How would you like to obtain your AVS? MyChart.  If dropped from the video visit, the video invitation should be resent by: Text to cell phone: [phone]  Will anyone else be joining your video visit? No      Video Start Time: 1401  Assessment & Plan     Anxiety    - PHQ9 Depression Screen  - Ambulatory referral to Psychiatry  She will continue to take 10 mg daily of the Lexapro. She would like to try and wean herself off of the medication if possible due to her loss of sex drive and the stigma of being on medication. She  will cut down to 5 mg as she is able to based on her anxiety severity over the next 30 days. She desires to not take any medication at all but right now knows that this is not feasible at this time. Insurance will cover her 10 mg daily dose.   We did discuss dual therapy with Wellbutrin and Buspar if she feels that the Lexapro dose needs to be increased. Her insurance will not cover over the 10 mg dose.     Moderate major depression (H)    - PHQ9 Depression Screen  - Ambulatory referral to Psychiatry    Panic attack    - hydrOXYzine pamoate (VISTARIL) 25 MG capsule  Dispense: 30 capsule; Refill: 0  - Ambulatory referral to Psychiatry  She did ask about taking a medication as needed for when she feels a panic attack coming. I discussed avoiding controlled substances due to her age and addiction risks when using that medication.         27 minutes spent on the date of the encounter doing patient visit and documentation            Return in about 4 weeks (around 2/22/2021) for anxiety, depression.    Naima Martinez NP  [facility]     Rosette Montero is 20 y.o. and presents to clinic today for the following health issues   HPI       Depression and Anxiety Follow-Up    How are you doing with your depression since your last visit?: Medium, taking Lexapro 10 mg  daily. She has been thinking about tapering herself down on dose    How are you doing with your anxiety since your last visit?: Medium    Are you having other symptoms that might be associated with depression or anxiety?: Yes:  some panic attacks. She is feeling as if the medication slows her reflexes and she has noted a significant loss of sex drive. Her last panic attack happened out of the blue yesterday when she was out to breakfast with friends. She could not identify any triggers, it just came on fast and she had to leave the establishment.     Have you had a significant life event?: No     Do you have any concerns with your use of alcohol or other drugs?: No    Social History     Tobacco Use     Smoking status: Never Smoker     Smokeless tobacco: Never Used   Substance Use Topics     Alcohol use: Not on file     Drug use: Not on file       PHQ-9 DEPRESSION SCALE 1/25/2021   Little interest or pleasure in doing things 1   Feeling down, depressed, or hopeless 1   Trouble falling or staying asleep, or sleeping too much 0   Feeling tired or having little energy 2   Poor appetite or overeating 2   Feeling bad about yourself - or that you are a failure or have let yourself or your family down 0   Trouble concentrating on things, such as reading the newspaper or watching television 1   Moving or speaking so slowly that other people could have noticed. Or the opposite - being so fidgety or restless that you have been moving around a lot more than usual 0   Thoughts that you would be better off dead, or of hurting yourself in some way 0   PHQ-9 Total Score 7   If you checked off any problems, how difficult have these problems made it for you to do your work, take care of things at home, or get along with other people? Not difficult at all   Some recent data might be hidden       [id number] Screening Results:  No data recorded  In the past two weeks have you had thoughts of suicide or self-harm?  No.    Do you have concerns  about your personal safety or the safety of others?   No    Suicide Assessment Five-step Evaluation and Treatment (SAFE-T)    How many servings of fruits and vegetables do you eat daily?  2-3    On average, how many sweetened beverages do you drink each day (Examples: soda, juice, sweet tea, etc.  Do NOT count diet or artificially sweetened beverages)?   1    How many days per week do you exercise enough to make your heart beat faster? 5    How many minutes a day do you exercise enough to make your heart beat faster? 20 -29     How many days per week do you miss taking your medication? 0        Review of Systems        Objective         Physical Exam      Review of Systems     Denies fever, chills, visual changes, fatigue, myalgias, nasal congestion, rhinorrhea, ear pain, or discharge, sore throat, swollen glands, breast mass, nipple discharge, breast changes, abdominal pain, cough, shortness of breath, chest pain, weight change, change in bowel habits, melena, rectal bleeding, dysuria, frequency, urgency, hematuria, polyuria, polydipsia, polyphagia, joint pain or swelling or erythema, edema, rash, weakness, paresthesias, vaginal discharge or bleeding        Objective:         There were no vitals taken for this visit.     Physical Exam:  General Appearance: Alert, cooperative, no distress, appears stated age  Lungs:  respirations unlabored  Skin: Skin color, texture, turgor normal, no rashes or lesions  Psych: engaged in conversation, good eye contact, well groomed, memory intact, mood appropriate                      Video-Visit Details    Type of service:  Video Visit    Video End Time (time video stopped): 1415  Originating Location (pt. Location): Home    Distant Location (provider location):  [facility]     Platform used for Video Visit: Madyson

## 2021-06-14 NOTE — TELEPHONE ENCOUNTER
I am will be working on dictating her note soon. She will be doing one tablet daily of the 10 mg going forward.

## 2021-06-14 NOTE — TELEPHONE ENCOUNTER
Please review. Pharmacy sent additional fax with information stating insurance only allowing one pill per day of medication. Virtual visit 1/25/21 - no new rx sent to pharmacy.

## 2021-06-14 NOTE — TELEPHONE ENCOUNTER
[facility] pharmacy fax.     Alternative request for Escitalopram 10mg 1.5 tab daily.     New insurance only allow max 1 tab per day, PA required. OR resend two different strengths.

## 2021-06-14 NOTE — TELEPHONE ENCOUNTER
I have not completed a visit with her for her anxiety since April. Please scheduled her for virtual visit today and I will address this. Thanks.

## 2021-06-15 NOTE — TELEPHONE ENCOUNTER
Refill Approved    Rx renewed per Medication Renewal Policy. Medication was last renewed on 2/3/21.    Oliver Seymour, [facility] Triage/Med Refill 3/3/2021     Requested Prescriptions   Pending Prescriptions Disp Refills     hydrOXYzine pamoate (VISTARIL) 25 MG capsule [Pharmacy Med Name: HYDROXYZINE FLORA 25 MG CAP] 90 capsule 0     Sig: TAKE 1 CAPSULE (25 MG TOTAL) BY MOUTH 3 (THREE) TIMES A DAY AS NEEDED FOR ANXIETY.       Antihistamine Refill Protocol Passed - 3/2/2021  9:30 AM        Passed - Patient has had office visit/physical in last year     Last office visit with prescriber/PCP: 9/11/2020 Naima Martinez NP OR same dept: 9/11/2020 Naima Martniez NP OR same specialty: 9/11/2020 Naima Martinez NP  Last physical: 8/27/2019 Last MTM visit: Visit date not found   Next visit within 3 mo: Visit date not found  Next physical within 3 mo: Visit date not found  Prescriber OR PCP: Naima Martinez NP  Last diagnosis associated with med order: 1. Panic attack  - hydrOXYzine pamoate (VISTARIL) 25 MG capsule [Pharmacy Med Name: HYDROXYZINE FLORA 25 MG CAP]; Take 1 capsule (25 mg total) by mouth 3 (three) times a day as needed for anxiety.  Dispense: 90 capsule; Refill: 0    If protocol passes may refill for 12 months if within 3 months of last provider visit (or a total of 15 months).

## 2021-06-16 PROBLEM — F41.9 ANXIETY: Status: ACTIVE | Noted: 2019-09-23

## 2021-06-16 PROBLEM — F32.1 MODERATE MAJOR DEPRESSION (H): Status: ACTIVE | Noted: 2021-01-24

## 2021-06-16 PROBLEM — L70.0 ACNE VULGARIS: Status: ACTIVE | Noted: 2019-08-27

## 2021-06-17 NOTE — PATIENT INSTRUCTIONS - HE
Patient Instructions by Hermann Cruz PA-C at 4/23/2019  3:30 PM     Author: Hermann Cruz PA-C Service: -- Author Type: Physician Assistant    Filed: 4/23/2019  4:38 PM Encounter Date: 4/23/2019 Status: Addendum    : Hermann Cruz PA-C (Physician Assistant)    Related Notes: Original Note by Hermann Cruz PA-C (Physician Assistant) filed at 4/23/2019  4:37 PM       Of deep breathing exercises each hour while awake to fully inflate and deflate your lungs.  Use of over-the-counter ibuprofen 3 tablets (600 mg) 3 times per day for analgesia and anti-inflammatory effect.  Topical ice to the posterior chest wall each hour while awake for the first 24 to 48 hours.  Take precautions to avoid damage to the skin.  You may then alternate ice and heat as he find relief.  It will take at least 3 weeks to fully heal the bone.    Use of over-the-counter foods that are high in vitamin D and calcium for bone replacement and repair.  You may take calcium supplements.  Follow-up with your primary care provider in 12 to 14 days for reevaluation retreatment if not getting good resolution or if new symptoms or concerns arise.          Patient Education     Rib Fracture    You broke one or more ribs. This is called a rib fracture. Rib fractures do not require a cast like other bones. They will heal by themselves in about 4 to 6 weeks. The first 3 to 4 weeks will be the most painful because deep breathing, coughing, or changing position from sitting to lying down, may cause the broken ends to move slightly.  Home care    Rest. You should not be doing any heavy lifting or strenuous exertion until the pain goes away.    It hurts to breathe when you have a broken rib. This puts you at risk of getting pneumonia from poor airflow through your lungs. To prevent this:  ? Take several very deep breaths once an hour while you're awake. Exhale through pursed lips as if you are blowing up a balloon. If possible, actually blow up a balloon or a  rubber glove. This exercise builds up pressure inside the lung and prevents collapse of the small air sacs of the lung. This exercise may cause some pain at the site of injury, which is normal.  ? You may have gotten a breathing exercise device called an incentive spirometer. Use it at least 4 times a day, or as directed.    Apply an ice pack over the injured area for 15 to 20 minutes every 1 to 2 hours. You should do this for the first 24 to 48 hours. You can make an ice pack by filling a plastic bag that seals at the top with ice cubes and then wrapping it with a thin towel. Continue with ice packs as needed for the relief of pain and swelling.    You may use over-the-counter pain medicine to control pain, unless another pain medicine was prescribed. If you have chronic liver or kidney disease or ever had a stomach ulcer or GI bleeding, talk with your healthcare provider before using these medicines.    If your pain is not controlled, contact your healthcare provider. Sometimes a stronger pain medicine may be needed. A nerve block can be done in case of severe pain. It will numb the nerve between the ribs.  Follow-up care  Follow up with your healthcare provider, or as advised. Rarely, a broken rib will cause complications within the first few days that may not be evident during your initial exam. This can include collapsed lung, bleeding around the lung or into the abdomen, or pneumonia. Therefore, watch for the signs below.  If X-rays were taken, you will be told of any new findings that may affect your care.  Call 911  Call 911 if you have:    Dizziness, weakness or fainting    Shortness of breath with or without chest discomfort    New or worsening abdominal pain    Discomfort in other areas of your upper body such as your shoulders, jaw, neck, or arms  When to seek medical advice  Call your healthcare provider right away if any of these occur:    Increasing chest pain with breathing    Fever of 100.4 F (38 C)  or higher, or as directed by your healthcare provider    Congested cough  Date Last Reviewed: 12/3/2015    0404-8593 [employer]. [address] [place] [address], [place] [address]. All rights reserved. This information is not intended as a substitute for professional medical care. Always follow your healthcare professional's instructions.           Patient Education     Rib Contusion or Minor Fracture    A rib contusion is a bruise to one or more rib bones. It may cause pain, tenderness, swelling, and a purplish tint to the skin. There may be a sharp pain with each breath. A rib contusion takes anywhere from a few days to a few weeks to heal. A minor rib fracture or break may cause the same symptoms as a rib contusion. The small crack may not be seen on a regular chest X-ray. Treatment for both problems is the same.  Home care    You may use over-the-counter pain medicine to control pain, unless another pain medicine was prescribed. If you have chronic liver or kidney disease or ever had a stomach ulcer or GI bleeding, talk with your healthcare provider before using these medicines.    Rest. Do not lift anything heavy or do any activity that causes pain.    Apply an ice pack over the injured area for 15 to 20 minutes every 1 to 2 hours. You should do this for the first 24 to 48 hours. You can make an ice pack by filling a plastic bag that seals at the top with ice cubes and then wrapping it with a thin towel. Continue with ice packs as needed for the relief of pain and swelling.    The first 3 to 4 weeks of healing will be the most painful. If your pain is not under control with the treatment given, call your healthcare provider. Sometimes a stronger pain medicine may be needed. A nerve block can be done in case of severe pain. It will numb the nerve between the ribs.  Follow-up care  Follow up with your healthcare provider, or as advised.  If X-rays were taken, you will be told of any new findings that may  affect your care.  Call 911  Call 911 if you have:    Dizziness, weakness or fainting    Shortness of breath with or without chest discomfort    New or worsening pain  When to seek medical advice  Call your healthcare provider right away if any of these occur:    Fever of 100.4 F (38 C) or higher, or as directed by your healthcare provider    Stomach pain  Date Last Reviewed: 12/2/2015 2000-2017 [employer]. [address] [place] [address], [place] [address]. All rights reserved. This information is not intended as a substitute for professional medical care. Always follow your healthcare professional's instructions.           Patient Education     Rib Fracture (Broken Rib)     A chest x-ray may be done.     Your ribs are curved bones in your chest. They help protect your lungs and expand and contract when you breathe. Children's ribs bend easily and can often withstand a blow or fall. But adult ribs are more likely to break (fracture) under stress. Even coughing or a hard sneeze can fracture a rib.  When to go to the Emergency Room (ER)  Although they can be painful, most rib fractures aren't serious. But they often make it hard to cough or breathe deeply. Get medical care right away if you have:    Trouble breathing.    Nausea, vomiting, or stomach pain with a sore or bruised rib.    Pain that worsens over time.    An injury to the chest or stomach.  What to expect in the ER  Here is what will happen in the ER:     A healthcare provider will ask about your injury and examine you carefully.    An X-ray of your chest will likely be taken to show any major damage to ribs and lungs. However, ribs can undergo small breaks that do not show up on X-rays, even though they still hurt.    You may be given medicine to ease your discomfort.    Rarely, rib fractures can cause a lung to collapse or lead to bleeding in the chest. In these cases, a tube will be inserted into the chest to reinflate the lung or drain the  blood.  Follow-up  You are likely to heal in 6 to 8 weeks. Most rib fractures heal on their own with no lasting effects. Call your healthcare provider right away if you notice any of these symptoms:    Increased chest pain    Shortness of breath    Fever    Coughing up blood  Date Last Reviewed: 9/26/2015 2000-2017 [employer]. [address] [place] [address], [place] [address]. All rights reserved. This information is not intended as a substitute for professional medical care. Always follow your healthcare professional's instructions.

## 2021-06-17 NOTE — PATIENT INSTRUCTIONS - HE
Patient Instructions by Pooja Childress PA-C at 6/20/2019  8:50 AM     Author: Pooja Childress PA-C Service: -- Author Type: Physician Assistant    Filed: 6/20/2019  9:07 AM Encounter Date: 6/20/2019 Status: Signed    : Pooja Childress PA-C (Physician Assistant)       Patient Education     Anaphylaxis  Anaphylaxis is a severe allergic reaction that can be life threatening. This reaction can happen in a few minutes, or a few hours after exposure to what you are allergic to. Some people are more prone to this than others.  The symptoms of an anaphylactic reaction may at first seem similar to other allergic reactions. If this has happened to you in the past, do not let the initial mild symptoms, such as a rash, hives and itching, mislead you. Your reaction can worsen very quickly and become much more severe and life threatening within minutes.  More severe symptoms include:    Trouble swallowing, feeling like your throat is closing    Trouble breathing, wheezing    Cool, moist or pale (blue in color) skin    Hoarse voice or trouble speaking    Nausea, vomiting, diarrhea, stomach cramps, or pain    Feeling faint or lightheaded, rapid heart rate, low blood pressure    Feeling dizzy or confused    Becoming very drowsy, poorly responsive, or trouble awakening    Seizure  Sometimes the cause may be obvious, like knowing you are allergic to peanuts. To help identify your allergen, remember:    When it started    What you were doing at the time or just before that    Any activities you were involved in    Any new products or contacts   Here are some common causes, but remember almost anything can cause a reaction, and you may not even be aware that you came into contact with one of these things.    Dust, mold, pollen    Plants, such as poison ivy and poison oak    Animals    Foods such as shrimp, shellfish, peanuts, milk products, gluten, eggs; also colorings, flavorings, additives    Insect bites or stings  such as bees, mosquitos, fleas, ticks    Medicines such as penicillin, sulfa drugs, amoxicillin, aspirin, ibuprofen; any medicine can cause a reaction    Jewelry such as nickel, or gold (new, or something youve worn for a while including zippers, and buttons)    Latex such as in gloves, clothes, toys, balloons, or some tapes (some people allergic to latex may also  have problems with foods like bananas, avocados, kiwi, papaya, or chestnuts)    Lotions, perfumes, cosmetics, soaps, shampoos, skincare products, nail products    Chemicals or dyes in clothing, linen, , hair dyes, soaps, iodine  If you are exposed to the same substance again, you may have the same or more severe reaction. Treatment for anaphylaxis is epinephrine (adrenalin). This is available by prescription as a self-injectable pen. If the cause of your reaction is known, you should avoid exposure in the future. If the cause is not known, follow up with your doctor for special testing to determine what you are allergic to.     Home care  If it was safe for you to go home, watch for any worsening of symptoms. You may need to be treated again.  Medicines  Injectable epinephrine  One of the key tools in treating anaphylaxis is early use of epinephrine. If you had a severe allergic or anaphylactic reaction, the doctor may prescribe a self- injectable epinephrine kit. If this was prescribed, carry it at all times. It can be life saving. Epinephrine can help stop the progression of an allergic reaction. Its effects are brief, so after you use the medicine, it is still very important to call 911 and get to an emergency room.  When to use injectable epinephrine. Use the epinephrine if you have a history of severe reactions or any of the following symptoms:    Swelling in your mouth or throat     Trouble speaking or swallowing    Trouble breathing    Feeling faint, low blood pressure, or becoming drowsy or poorly responsive    Worsening rash  How to use  injectable epinephrine:    Hold the syringe firmly in your hand with the orange (or black) needle end away from your thumb    Be careful not to stick your fingers or hand with the needle.    At the opposite end, pull off the activation cap- the blue or grey tab    Holding the syringe tightly, jab it into the outer part of your upper thigh. This is one of the softest, fleshiest parts of the upper leg, and is not near a major blood vessel or nerve. Be careful not to inject it into your hip or any place that there is a pulse.    You can inject it through pants, but make sure not to inject it into the seam of the pants.    Do not pull it out right away. Try to hold the needle in place for 10 seconds.    Massage the spot for a few seconds or as directed by your healthcare provider.    If you are injecting it in someone else or a child, try to hold them or their leg still. If they jerk or yank their legs away as you are doing it, it can cause a cut on their leg.  You may feel shaky, jittery, nervous, and anxious after the injection. Although it is difficult, try to relax. This is a side effect of the epinephrine, and should stop after a few minutes   Important    Get to the emergency room after using the epinephrine. Its affect will wear off, and you may have a second reaction. This could even happen hours later.    Never intentionally eat, use, or expose yourself to the substance that caused the anaphylactic reaction.  Nothing is foolproof, including the injectable epinephrine.  Other medicines  The doctor may prescribe medicine to relieve swelling, itching, and pain. Follow the doctors instructions when using this medicine.     Oral diphenhydramine is an antihistamine available at drug and grocery stores. Unless a prescription antihistamine was given, diphenhydramine may be used to reduce itching if large areas of the skin are involved. It may make you sleepy, so be careful using it in the daytime or when going to  school, working, or driving.     Do not use diphenhydramine cream on your skin, because in some people it can cause a further reaction.    You may use over-the-counter acetaminophen or ibuprofen to control pain, unless another pain medicine was prescribed.    If you were prescribed any medicines to prevent symptoms from returning, be sure to take them exactly as directed.  General care    Rest at home for the next 24 hours.    Avoid tobacco and alcohol consumption. These may worsen your symptoms.    If you know what caused your reaction today, avoid that in the future since the next reaction may be worse. Let your family members, friends and personal physician know about your allergic reaction.    If your allergy was to food, learn how to read food labels so you can check for that ingredient. If a product does not have a label, it is best to avoid it.    Consider carrying an identification card or getting a medical alert bracelet to inform medical personnel of your condition in case you cannot tell them.    Tell all of your healthcare providers that you had an anaphylactic reaction. Make certain the information is added to your electronic or paper medical records.  Follow-up care  Follow up with your doctor or as advised if you are not improving over the next 1 to 2 days.  Call 911  Call 911 if any of these occur:    Trouble breathing or swallowing, wheezing    Hoarse voice or trouble speaking    Chest pain    Confused    Very drowsy or trouble awakening    Fainting or loss of consciousness    Rapid heart rate    Vomiting blood, or large amounts of blood in stool    Seizure    Swelling in the eyes, mouth, face, or tongue    Dizziness or weakness    Cool, moist, or pale (blue in color) skin    Nausea, vomiting, diarrhea, stomach cramps, or abdominal pain  When to seek medical advice  Call your healthcare provider right away if any of these occur:    Worsening of your symptoms    New symptoms develop    Symptoms don't  go away or come back  Date Last Reviewed: 4/1/2017 2000-2017 [employer]. [address], [place] [address]. All rights reserved. This information is not intended as a substitute for professional medical care. Always follow your healthcare professional's instructions.

## 2021-06-21 NOTE — PROGRESS NOTES
"Chief Complaint   Patient presents with     Test Results     Pt would like to discuss positive ZAYRA, she is noticing hair loss as well.       HPI: This is a rather complicated 18-year-old female with a family history of lupus who presents for evaluation.  She has a history of elevated ZAYRA and rheumatology notes from 5/16/2018 were reviewed showing that she had positive ZAYRA but secondary factors were negative.  She also had a history of heart murmur and echocardiogram most recently was reviewed.  Unfortunately she did not receive those results but they were normal and I reviewed those with her.  Her concern today is hair loss.  She notes increased hair loss with hair coming out in the shower.  She perceives that she has hair loss particularly with her receding frontal hairline.    ROS: 10 point system review complete notable for elevated ZAYRA.  Notable for history of mild vaginitis.  Otherwise negative.    SH:     She does not smoke, she works at [employer], and is starting to be a      FH: Notable for mother with lupus    Meds:  [facility] currently has no medications in their medication list.    O:  /60   Pulse (!) 57   Resp 16   Ht 5' 2.25\" (1.581 m)   Wt 109 lb 2 oz (49.5 kg)   SpO2 99%   BMI 19.80 kg/m     Constitutional:    --Vitals as above  --No acute distress  Eyes-  --Sclera noninjected  --Lids and conjunctiva normal  ENT-  --TMs clear  --Sclera noninjected  --Pharynx not erythematous  --Examination of the hair of the head shows no evidence of patches of hair loss.  Neck-  --Neck supple with no cervical lymphadenopathy  Lungs-  --Clear to Auscultation  Heart-  --Regular rate and rhythm  Abdomen--  --Soft, non-tender, non-distended  Skin-  --Pink and dry  Psych-  --Alert and oriented  --Normal affect      A/P:   1. Hair loss  The patient has perceived hair loss and I do not doubt her.  We will check TSH.  Her stress levels are normal, her diet is reportedly good, and she has no " evidence of trichotillomania.  - Thyroid Stimulating Hormone (TSH)    2. Elevated antinuclear antibody (ZAYRA) level  Patient will follow-up with rheumatology concerning her ZAYRA levels.

## 2021-06-21 NOTE — PROGRESS NOTES
Let pt know that TSH is normal and I am not concerned about her thyroid.  I can not explain the hair loss but would be happy to have her visit with Rheumatology again if she prefers.

## 2021-06-24 NOTE — PATIENT INSTRUCTIONS - HE
I've sent the fluconazole to the pharmacy.  1 tab and then another in 3 days if not better.    If it doesn't get better, we'll have to do that pelvic exam.    Thank you for coming in today!    If you receive a survey from [employer] about your experience today, it would be very helpful if you could fill it out to let us know what went well and what we can improve!    General Information:    Today you had your appointment with Maury Camilo NP    My hours are:    Monday : Out of clinic  Tuesday : 8:00AM - 5:00 PM  Wednesday: 8:00AM - 5:00 PM  Thursday: 8:00AM - 5:00 PM  Friday: 8:00AM - 5:00 PM    I am not in the office Mondays. Therefore non-urgent calls and medical messages received on Monday will be addressed when I am back in the office on Tuesday. Urgent matters will be reviewed and addressed by one of my partners in the office as needed.    If lab work was done today as part of your evaluation you will generally be contacted via wishkickert, mail, or phone with the results within 1-5 days. If there is an alarming result we will contact you by phone. Lab results come back at varying times, I generally wait until all lab results are available before making comments on the results.     If you need refills please contact your pharmacy. They will send a refill request to me to review. Please allow 3-5 business days for us to process all refill requests.     My Clinical Assistant is Alicja. Please call us at [phone] or send a medical message with any questions or concerns.

## 2021-06-27 NOTE — PROGRESS NOTES
Progress Notes by Hermann Cruz PA-C at 4/23/2019  3:30 PM     Author: Hermann Cruz PA-C Service: -- Author Type: Physician Assistant    Filed: 4/23/2019  7:07 PM Encounter Date: 4/23/2019 Status: Signed    : Hermann Cruz PA-C (Physician Assistant)       Subjective:      Patient ID: Rosette Montero is a 18 y.o. female.    Chief Complaint:    HPI  Rosette Montero is a 18 y.o. female who presents today complaining of right upper posterior thoracic chest wall pain.  Patient recounts past medical history for being at her gym in [place] when she had a chiropractor doing manipulation on her back.  He made a sudden movement and she felt a crack and had immediate pain on the posterior chest wall.  She then presented to the walk-in care for evaluation and treatment.  At this time she does not have any shortness of breath hemoptysis vomiting or diarrhea.  Is only located to the one area of the posterior chest wall.  She does not have any other complaints to address.  She does localize it not to the spinal area but more towards the medial border of the scapula region.      No past medical history on file.    No past surgical history on file.    No family history on file.    Social History     Tobacco Use   ? Smoking status: Never Smoker   ? Smokeless tobacco: Never Used   Substance Use Topics   ? Alcohol use: Not on file   ? Drug use: Not on file       Review of Systems  As above in HPI, otherwise balance of Review of Systems are negative.    Objective:     /70 (Patient Site: Right Arm, Patient Position: Sitting, Cuff Size: Adult Small)   Pulse 81   Temp 98.3  F (36.8  C) (Oral)   Resp 18   Wt 106 lb (48.1 kg)   LMP 04/16/2019 (Approximate)   SpO2 99%   BMI 19.23 kg/m      Physical Exam  General: Patient is resting comfortably no acute distress is afebrile  HEENT: Head is normocephalic atraumatic   eyes are PERRL EOMI sclera anicteric   LUNGS: Clear to auscultation bilaterally  HEART: Regular rate and  rhythm  Skin: Without rash non-diaphoretic  Musculoskeletal: Patient has pain to palpation over the posterior thoracic chest wall in the medial border of the scapula.  This is between the seventh eighth and ninth rib.  It does reproduce her symptoms.  Does not have any pain over the clavicles the AC joints or over the manubrium or the left or right midclavicular line of the anterior chest wall.  No similar pain over the left posterior chest wall.      The visit lasted a total of 25 minutes that I spent face to face with the patient out of a 35 minute office visit. Over 50% of the time was spent counseling, coordinating care, reviewing pathophysiology and treatment options and educating the patient about the management plan.      Imaging    Xr Ribs Right W Pa Chest    Result Date: 4/23/2019  EXAM: XR RIBS RIGHT W PA CHEST LOCATION: [facility] DATE/TIME: 4/23/2019 4:22 PM INDICATION: right posterior chest wall pain - Rule out rib fracture COMPARISON: None FINDINGS: The heart and pulmonary vasculature are normal the lungs are clear. No pneumothorax or pleural effusion is seen. On one of the 3 views there is a lucent line crossing the right posterior seventh rib that is suspicious for a nondisplaced fracture but is not confirmed on the other views.    I personally reviewed and discussed findings with the patient.  My own personal interpretation and radiologist will over read x-rays    Assessment:     Procedures    The primary encounter diagnosis was Rib pain on right side. A diagnosis of Closed fracture of one rib of right side, initial encounter was also pertinent to this visit.    Plan:     1. Rib pain on right side  XR Ribs Right W PA Chest    ketorolac injection 30 mg (TORADOL)   2. Closed fracture of one rib of right side, initial encounter         I had a long conversation with the patient regarding her injury, the pathophysiology and treatment protocol.  We did talk about good healthy nutrition,  vitamin D and calcium supplementation for bone building and healing the fracture.  Questions were answered to her satisfaction before discharge.  We did stress activity modification rest and she can follow-up with her primary care provider looking for healing at the 3-week colt for reevaluation..    Patient Instructions     Of deep breathing exercises each hour while awake to fully inflate and deflate your lungs.  Use of over-the-counter ibuprofen 3 tablets (600 mg) 3 times per day for analgesia and anti-inflammatory effect.  Topical ice to the posterior chest wall each hour while awake for the first 24 to 48 hours.  Take precautions to avoid damage to the skin.  You may then alternate ice and heat as he find relief.  It will take at least 3 weeks to fully heal the bone.    Use of over-the-counter foods that are high in vitamin D and calcium for bone replacement and repair.  You may take calcium supplements.  Follow-up with your primary care provider in 12 to 14 days for reevaluation retreatment if not getting good resolution or if new symptoms or concerns arise.          Patient Education     Rib Fracture    You broke one or more ribs. This is called a rib fracture. Rib fractures do not require a cast like other bones. They will heal by themselves in about 4 to 6 weeks. The first 3 to 4 weeks will be the most painful because deep breathing, coughing, or changing position from sitting to lying down, may cause the broken ends to move slightly.  Home care    Rest. You should not be doing any heavy lifting or strenuous exertion until the pain goes away.    It hurts to breathe when you have a broken rib. This puts you at risk of getting pneumonia from poor airflow through your lungs. To prevent this:  ? Take several very deep breaths once an hour while you're awake. Exhale through pursed lips as if you are blowing up a balloon. If possible, actually blow up a balloon or a rubber glove. This exercise builds up pressure  inside the lung and prevents collapse of the small air sacs of the lung. This exercise may cause some pain at the site of injury, which is normal.  ? You may have gotten a breathing exercise device called an incentive spirometer. Use it at least 4 times a day, or as directed.    Apply an ice pack over the injured area for 15 to 20 minutes every 1 to 2 hours. You should do this for the first 24 to 48 hours. You can make an ice pack by filling a plastic bag that seals at the top with ice cubes and then wrapping it with a thin towel. Continue with ice packs as needed for the relief of pain and swelling.    You may use over-the-counter pain medicine to control pain, unless another pain medicine was prescribed. If you have chronic liver or kidney disease or ever had a stomach ulcer or GI bleeding, talk with your healthcare provider before using these medicines.    If your pain is not controlled, contact your healthcare provider. Sometimes a stronger pain medicine may be needed. A nerve block can be done in case of severe pain. It will numb the nerve between the ribs.  Follow-up care  Follow up with your healthcare provider, or as advised. Rarely, a broken rib will cause complications within the first few days that may not be evident during your initial exam. This can include collapsed lung, bleeding around the lung or into the abdomen, or pneumonia. Therefore, watch for the signs below.  If X-rays were taken, you will be told of any new findings that may affect your care.  Call 911  Call 911 if you have:    Dizziness, weakness or fainting    Shortness of breath with or without chest discomfort    New or worsening abdominal pain    Discomfort in other areas of your upper body such as your shoulders, jaw, neck, or arms  When to seek medical advice  Call your healthcare provider right away if any of these occur:    Increasing chest pain with breathing    Fever of 100.4 F (38 C) or higher, or as directed by your healthcare  provider    Congested cough  Date Last Reviewed: 12/3/2015    1860-5248 [employer]. [address] [place] [address], [place] [address]. All rights reserved. This information is not intended as a substitute for professional medical care. Always follow your healthcare professional's instructions.           Patient Education     Rib Contusion or Minor Fracture    A rib contusion is a bruise to one or more rib bones. It may cause pain, tenderness, swelling, and a purplish tint to the skin. There may be a sharp pain with each breath. A rib contusion takes anywhere from a few days to a few weeks to heal. A minor rib fracture or break may cause the same symptoms as a rib contusion. The small crack may not be seen on a regular chest X-ray. Treatment for both problems is the same.  Home care    You may use over-the-counter pain medicine to control pain, unless another pain medicine was prescribed. If you have chronic liver or kidney disease or ever had a stomach ulcer or GI bleeding, talk with your healthcare provider before using these medicines.    Rest. Do not lift anything heavy or do any activity that causes pain.    Apply an ice pack over the injured area for 15 to 20 minutes every 1 to 2 hours. You should do this for the first 24 to 48 hours. You can make an ice pack by filling a plastic bag that seals at the top with ice cubes and then wrapping it with a thin towel. Continue with ice packs as needed for the relief of pain and swelling.    The first 3 to 4 weeks of healing will be the most painful. If your pain is not under control with the treatment given, call your healthcare provider. Sometimes a stronger pain medicine may be needed. A nerve block can be done in case of severe pain. It will numb the nerve between the ribs.  Follow-up care  Follow up with your healthcare provider, or as advised.  If X-rays were taken, you will be told of any new findings that may affect your care.  Call 911  Call 911 if you  have:    Dizziness, weakness or fainting    Shortness of breath with or without chest discomfort    New or worsening pain  When to seek medical advice  Call your healthcare provider right away if any of these occur:    Fever of 100.4 F (38 C) or higher, or as directed by your healthcare provider    Stomach pain  Date Last Reviewed: 12/2/2015 2000-2017 [employer]. [address] [place] [address]. All rights reserved. This information is not intended as a substitute for professional medical care. Always follow your healthcare professional's instructions.           Patient Education     Rib Fracture (Broken Rib)     A chest x-ray may be done.     Your ribs are curved bones in your chest. They help protect your lungs and expand and contract when you breathe. Children's ribs bend easily and can often withstand a blow or fall. But adult ribs are more likely to break (fracture) under stress. Even coughing or a hard sneeze can fracture a rib.  When to go to the Emergency Room (ER)  Although they can be painful, most rib fractures aren't serious. But they often make it hard to cough or breathe deeply. Get medical care right away if you have:    Trouble breathing.    Nausea, vomiting, or stomach pain with a sore or bruised rib.    Pain that worsens over time.    An injury to the chest or stomach.  What to expect in the ER  Here is what will happen in the ER:     A healthcare provider will ask about your injury and examine you carefully.    An X-ray of your chest will likely be taken to show any major damage to ribs and lungs. However, ribs can undergo small breaks that do not show up on X-rays, even though they still hurt.    You may be given medicine to ease your discomfort.    Rarely, rib fractures can cause a lung to collapse or lead to bleeding in the chest. In these cases, a tube will be inserted into the chest to reinflate the lung or drain the blood.  Follow-up  You are likely to heal in 6  to 8 weeks. Most rib fractures heal on their own with no lasting effects. Call your healthcare provider right away if you notice any of these symptoms:    Increased chest pain    Shortness of breath    Fever    Coughing up blood  Date Last Reviewed: 9/26/2015 2000-2017 [employer]. [address] [place] [address]. All rights reserved. This information is not intended as a substitute for professional medical care. Always follow your healthcare professional's instructions.

## 2021-06-27 NOTE — PROGRESS NOTES
Progress Notes by Pooja Childress PA-C at 6/20/2019  8:50 AM     Author: Pooja Childress PA-C Service: -- Author Type: Physician Assistant    Filed: 6/20/2019  9:20 AM Encounter Date: 6/20/2019 Status: Addendum    : Pooja Childress PA-C (Physician Assistant)    Related Notes: Original Note by Pooja Childress PA-C (Physician Assistant) filed at 6/20/2019  9:18 AM         Assessment:       Facial swelling - likely secondary to unknown allergic reaction. Appears to be resolving with benadryl.      Plan:       Discussed signs of anaphylaxis and when to call 911 or be seen in the emergency room immediately.  OTC antihistamines.  Washing pillow cases / bed sheets, avoiding increased outdoor exposure, and air filters discussed.  Follow-up with PCP if no improvement in 1 week.      Patient Instructions   Patient Education     Anaphylaxis  Anaphylaxis is a severe allergic reaction that can be life threatening. This reaction can happen in a few minutes, or a few hours after exposure to what you are allergic to. Some people are more prone to this than others.  The symptoms of an anaphylactic reaction may at first seem similar to other allergic reactions. If this has happened to you in the past, do not let the initial mild symptoms, such as a rash, hives and itching, mislead you. Your reaction can worsen very quickly and become much more severe and life threatening within minutes.  More severe symptoms include:    Trouble swallowing, feeling like your throat is closing    Trouble breathing, wheezing    Cool, moist or pale (blue in color) skin    Hoarse voice or trouble speaking    Nausea, vomiting, diarrhea, stomach cramps, or pain    Feeling faint or lightheaded, rapid heart rate, low blood pressure    Feeling dizzy or confused    Becoming very drowsy, poorly responsive, or trouble awakening    Seizure  Sometimes the cause may be obvious, like knowing you are allergic to peanuts. To help identify your  allergen, remember:    When it started    What you were doing at the time or just before that    Any activities you were involved in    Any new products or contacts   Here are some common causes, but remember almost anything can cause a reaction, and you may not even be aware that you came into contact with one of these things.    Dust, mold, pollen    Plants, such as poison ivy and poison oak    Animals    Foods such as shrimp, shellfish, peanuts, milk products, gluten, eggs; also colorings, flavorings, additives    Insect bites or stings such as bees, mosquitos, fleas, ticks    Medicines such as penicillin, sulfa drugs, amoxicillin, aspirin, ibuprofen; any medicine can cause a reaction    Jewelry such as nickel, or gold (new, or something youve worn for a while including zippers, and buttons)    Latex such as in gloves, clothes, toys, balloons, or some tapes (some people allergic to latex may also  have problems with foods like bananas, avocados, kiwi, papaya, or chestnuts)    Lotions, perfumes, cosmetics, soaps, shampoos, skincare products, nail products    Chemicals or dyes in clothing, linen, , hair dyes, soaps, iodine  If you are exposed to the same substance again, you may have the same or more severe reaction. Treatment for anaphylaxis is epinephrine (adrenalin). This is available by prescription as a self-injectable pen. If the cause of your reaction is known, you should avoid exposure in the future. If the cause is not known, follow up with your doctor for special testing to determine what you are allergic to.     Home care  If it was safe for you to go home, watch for any worsening of symptoms. You may need to be treated again.  Medicines  Injectable epinephrine  One of the key tools in treating anaphylaxis is early use of epinephrine. If you had a severe allergic or anaphylactic reaction, the doctor may prescribe a self- injectable epinephrine kit. If this was prescribed, carry it at all times.  It can be life saving. Epinephrine can help stop the progression of an allergic reaction. Its effects are brief, so after you use the medicine, it is still very important to call 911 and get to an emergency room.  When to use injectable epinephrine. Use the epinephrine if you have a history of severe reactions or any of the following symptoms:    Swelling in your mouth or throat     Trouble speaking or swallowing    Trouble breathing    Feeling faint, low blood pressure, or becoming drowsy or poorly responsive    Worsening rash  How to use injectable epinephrine:    Hold the syringe firmly in your hand with the orange (or black) needle end away from your thumb    Be careful not to stick your fingers or hand with the needle.    At the opposite end, pull off the activation cap- the blue or grey tab    Holding the syringe tightly, jab it into the outer part of your upper thigh. This is one of the softest, fleshiest parts of the upper leg, and is not near a major blood vessel or nerve. Be careful not to inject it into your hip or any place that there is a pulse.    You can inject it through pants, but make sure not to inject it into the seam of the pants.    Do not pull it out right away. Try to hold the needle in place for 10 seconds.    Massage the spot for a few seconds or as directed by your healthcare provider.    If you are injecting it in someone else or a child, try to hold them or their leg still. If they jerk or yank their legs away as you are doing it, it can cause a cut on their leg.  You may feel shaky, jittery, nervous, and anxious after the injection. Although it is difficult, try to relax. This is a side effect of the epinephrine, and should stop after a few minutes   Important    Get to the emergency room after using the epinephrine. Its affect will wear off, and you may have a second reaction. This could even happen hours later.    Never intentionally eat, use, or expose yourself to the substance that  caused the anaphylactic reaction.  Nothing is foolproof, including the injectable epinephrine.  Other medicines  The doctor may prescribe medicine to relieve swelling, itching, and pain. Follow the doctors instructions when using this medicine.     Oral diphenhydramine is an antihistamine available at drug and grocery stores. Unless a prescription antihistamine was given, diphenhydramine may be used to reduce itching if large areas of the skin are involved. It may make you sleepy, so be careful using it in the daytime or when going to school, working, or driving.     Do not use diphenhydramine cream on your skin, because in some people it can cause a further reaction.    You may use over-the-counter acetaminophen or ibuprofen to control pain, unless another pain medicine was prescribed.    If you were prescribed any medicines to prevent symptoms from returning, be sure to take them exactly as directed.  General care    Rest at home for the next 24 hours.    Avoid tobacco and alcohol consumption. These may worsen your symptoms.    If you know what caused your reaction today, avoid that in the future since the next reaction may be worse. Let your family members, friends and personal physician know about your allergic reaction.    If your allergy was to food, learn how to read food labels so you can check for that ingredient. If a product does not have a label, it is best to avoid it.    Consider carrying an identification card or getting a medical alert bracelet to inform medical personnel of your condition in case you cannot tell them.    Tell all of your healthcare providers that you had an anaphylactic reaction. Make certain the information is added to your electronic or paper medical records.  Follow-up care  Follow up with your doctor or as advised if you are not improving over the next 1 to 2 days.  Call 911  Call 911 if any of these occur:    Trouble breathing or swallowing, wheezing    Hoarse voice or trouble  speaking    Chest pain    Confused    Very drowsy or trouble awakening    Fainting or loss of consciousness    Rapid heart rate    Vomiting blood, or large amounts of blood in stool    Seizure    Swelling in the eyes, mouth, face, or tongue    Dizziness or weakness    Cool, moist, or pale (blue in color) skin    Nausea, vomiting, diarrhea, stomach cramps, or abdominal pain  When to seek medical advice  Call your healthcare provider right away if any of these occur:    Worsening of your symptoms    New symptoms develop    Symptoms don't go away or come back  Date Last Reviewed: 4/1/2017 2000-2017 [employer]. [address] [place], [place] [id number]. All rights reserved. This information is not intended as a substitute for professional medical care. Always follow your healthcare professional's instructions.             Subjective:       Rosette Montero is a 19 y.o. female here for evaluation of facial swelling. Onset was thi morning. Patient woke up with puffy eyes and lip swelling. Associated symptoms include mild scalp itchiness and mild sore throat. Patient denies fevers, rash, tongue swelling, slurred speech, rhinorrhea, eye itchiness, difficulty swallowing, shortness of breath, dizziness, and lightheadedness. She has a history of unknown environmental allergies, suspected pollen. She normally takes benadryl at night when her allergies bother her, but she has never experienced swelling before. She took a dose of benadryl this morning and her symptoms are improving. Patient denies new exposures including food, insect stings/bites, medications, soaps, lotions, and detergetns. She was treated for a sinus infection 3 weeks ago with Augmentin. She then developed dizziness at that time, treated with meclizine with good symptom relief. Dizziness has resolved.    The following portions of the patient's history were reviewed and updated as appropriate: allergies, current medications and problem  list.    Review of Systems  Pertinent items are noted in HPI.     Allergies  No Known Allergies      Objective:       /73   Pulse 71   Temp 98.4  F (36.9  C) (Oral)   Resp 12   Wt 101 lb 9.6 oz (46.1 kg)   SpO2 99%   BMI 18.43 kg/m    General appearance: alert, appears stated age, cooperative, no distress and non-toxic  Head: Normocephalic, without obvious abnormality, atraumatic, sinuses nontender to percussion  Eyes: conjunctivae/corneas clear. PERRL, EOM's intact. Fundi benign.  Ears: normal TM's and external ear canals both ears  Nose: no discharge  Throat: lips, mucosa, and tongue normal; teeth and gums normal; no tonsil swelling  Neck: no adenopathy and supple, symmetrical, trachea midline   Skin: no rash or lesions; normal warmth to touch, no erythema or swelling noted.

## 2021-07-14 PROBLEM — F33.2 SEVERE EPISODE OF RECURRENT MAJOR DEPRESSIVE DISORDER, WITHOUT PSYCHOTIC FEATURES (H): Status: RESOLVED | Noted: 2019-09-23 | Resolved: 2021-01-24

## 2021-08-15 ENCOUNTER — HEALTH MAINTENANCE LETTER (OUTPATIENT)
Age: 21
End: 2021-08-15

## 2021-10-19 PROBLEM — F32.9 MAJOR DEPRESSION: Status: ACTIVE | Noted: 2021-01-24

## 2022-09-18 ENCOUNTER — HEALTH MAINTENANCE LETTER (OUTPATIENT)
Age: 22
End: 2022-09-18

## 2023-10-08 ENCOUNTER — HEALTH MAINTENANCE LETTER (OUTPATIENT)
Age: 23
End: 2023-10-08